# Patient Record
Sex: MALE | Race: WHITE | NOT HISPANIC OR LATINO | Employment: FULL TIME | URBAN - METROPOLITAN AREA
[De-identification: names, ages, dates, MRNs, and addresses within clinical notes are randomized per-mention and may not be internally consistent; named-entity substitution may affect disease eponyms.]

---

## 2017-05-15 ENCOUNTER — APPOINTMENT (OUTPATIENT)
Dept: RADIOLOGY | Facility: MEDICAL CENTER | Age: 52
End: 2017-05-15
Attending: FAMILY MEDICINE
Payer: COMMERCIAL

## 2017-05-15 ENCOUNTER — OFFICE VISIT (OUTPATIENT)
Dept: MEDICAL GROUP | Age: 52
End: 2017-05-15
Payer: COMMERCIAL

## 2017-05-15 VITALS
DIASTOLIC BLOOD PRESSURE: 70 MMHG | OXYGEN SATURATION: 99 % | SYSTOLIC BLOOD PRESSURE: 118 MMHG | HEART RATE: 72 BPM | TEMPERATURE: 98.1 F | WEIGHT: 223.6 LBS | BODY MASS INDEX: 33.12 KG/M2 | HEIGHT: 69 IN

## 2017-05-15 DIAGNOSIS — J40 BRONCHITIS: ICD-10-CM

## 2017-05-15 DIAGNOSIS — R05.9 COUGH: ICD-10-CM

## 2017-05-15 PROCEDURE — 99214 OFFICE O/P EST MOD 30 MIN: CPT | Performed by: FAMILY MEDICINE

## 2017-05-15 RX ORDER — ALBUTEROL SULFATE 90 UG/1
2 AEROSOL, METERED RESPIRATORY (INHALATION) EVERY 6 HOURS PRN
Qty: 8.5 G | Refills: 0 | Status: SHIPPED | OUTPATIENT
Start: 2017-05-15 | End: 2017-09-14

## 2017-05-15 RX ORDER — ACETAMINOPHEN, DEXTROMETHORPHAN HYDROBROMIDE, DIPHENHYDRAMINE HYDROCHLORIDE, GUAIFENESIN, AND PHENYLEPHRINE HYDROCHLORIDE 5-325MG/10
1 KIT ORAL 2 TIMES DAILY
Qty: 1 BOTTLE | Refills: 0 | Status: SHIPPED | OUTPATIENT
Start: 2017-05-15 | End: 2017-08-22

## 2017-05-15 RX ORDER — LATANOPROST 50 UG/ML
SOLUTION/ DROPS OPHTHALMIC
Refills: 4 | COMMUNITY
Start: 2017-04-14

## 2017-05-15 RX ORDER — AZITHROMYCIN 250 MG/1
TABLET, FILM COATED ORAL
Qty: 6 TAB | Refills: 0 | Status: SHIPPED | OUTPATIENT
Start: 2017-05-15 | End: 2017-05-20

## 2017-05-16 NOTE — PROGRESS NOTES
This medical record contains text that has been entered with the assistance of computer voice recognition and dictation software.  Therefore, it may contain unintended errors in text, spelling, punctuation, or grammar    No chief complaint on file.      Danny Guerrero is a 51 y.o. male here evaluation and management of: cough x 2wks with generalized mallaise      HPI:       Bronchitis  Patient is a 51-year-old male who presents to clinic with a chief complaint of cough which is nonproductive for about 2 weeks now. He states sometimes he coughs to the point where he gags, he cannot sleep at night as result. He did have associated generalized malaise which seems to be improving, no fevers no chills no headaches no neck stiffness no double vision, no nausea no vomiting. He is able tolerate by mouth fluids. He has children at home however he became sick before them.      Current medicines (including changes today)  Current Outpatient Prescriptions   Medication Sig Dispense Refill   • latanoprost (XALATAN) 0.005 % Solution PLACE 1 DROP INTO BOTH EYES AT BEDTIME  4   • azithromycin (ZITHROMAX) 250 MG Tab 2 tabs by mouth day 1, 1 tab by mouth days 2-5 6 Tab 0   • acetaminophen-codeine 120-12 MG/5ML suspension Take 10 mL by mouth every bedtime. 120 mL 0   • albuterol 108 (90 BASE) MCG/ACT Aero Soln inhalation aerosol Inhale 2 Puffs by mouth every 6 hours as needed for Shortness of Breath. 8.5 g 0   • PE-Diphenhydramine-DM-GG-APAP (MUCINEX CHILD MS DAY-NIGHT CLD) Misc Take 1 Application by mouth 2 Times a Day. 1 Bottle 0   • lisinopril (PRINIVIL) 20 MG Tab Take 1 Tab by mouth every day. 90 Tab 3   • atenolol (TENORMIN) 50 MG Tab Take 1 Tab by mouth every day. 90 Tab 3   • lovastatin (MEVACOR) 40 MG tablet Take 1 Tab by mouth every day. N THE EVENING 90 Tab 3   • omeprazole (PRILOSEC) 20 MG delayed-release capsule Take 1 Cap by mouth every day. 90 Cap 3   • alprazolam (XANAX) 0.5 MG Tab Take 1 Tab by mouth at bedtime as needed  "for Sleep or Anxiety. 30 Tab 0   • indomethacin SR (INDOCIN SR) 75 MG Cap CR Take 1 Cap by mouth 2 times a day. 30 Cap 0   • timolol (TIMOPTIC OCUDOSE) 0.5 % SOLN Place 1 Drop in both eyes 2 times a day.     • docosahexanoic acid (FISH OIL) 1000 MG CAPS Take 2,000 mg by mouth every day.       No current facility-administered medications for this visit.     He  has a past medical history of HTN; Hyperlipidemia; GERD (gastroesophageal reflux disease); Glaucoma; Kidney stones; Allergic rhinitis; Vitamin D deficiency; Impaired fasting glucose; and Anxiety.  He  has no past surgical history on file.  Social History   Substance Use Topics   • Smoking status: Never Smoker    • Smokeless tobacco: Never Used   • Alcohol Use: 0.0 oz/week     0 Standard drinks or equivalent per week      Comment: rare     Social History     Social History Narrative     Family History   Problem Relation Age of Onset   • Hypertension Father      Family Status   Relation Status Death Age   • Mother Alive    • Father Alive    • Sister Alive    • Brother Alive    • Maternal Grandmother     • Maternal Grandfather     • Paternal Grandmother     • Paternal Grandfather     • Sister Alive    • Brother Alive    • Brother Alive          ROS  Please see hpi    All other systems reviewed and are negative     Objective:     Blood pressure 118/70, pulse 72, temperature 36.7 °C (98.1 °F), height 1.753 m (5' 9.02\"), weight 101.424 kg (223 lb 9.6 oz), SpO2 99 %. Body mass index is 33 kg/(m^2).  Physical Exam:    Constitutional: Alert, no distress.  Skin: Warm, dry, good turgor, no rashes in visible areas.  Eye: Equal, round and reactive, conjunctiva clear, lids normal.  ENMT: Lips without lesions, good dentition, oropharynx clear.  Neck: Trachea midline, no masses, no thyromegaly. No cervical or supraclavicular lymphadenopathy.  Respiratory: Unlabored respiratory effort, lungs clear to auscultation, no wheezes, no " abiel.  Cardiovascular: Normal S1, S2, no murmur, no edema.  Abdomen: Soft, non-tender, no masses, no hepatosplenomegaly.  Psych: Alert and oriented x3, normal affect and mood.          Assessment and Plan:   The following treatment plan was discussed, again this medical record contains text that has been entered with the assistance of computer voice recognition and dictation software.  Therefore, it may contain unintended errors in text, spelling, punctuation, or grammar    1. Bronchitis  Since symptoms persist for 2wks and appear severe  Will begin antibiotics and chest plain films    - DX-CHEST-2 VIEWS; Future  - azithromycin (ZITHROMAX) 250 MG Tab; 2 tabs by mouth day 1, 1 tab by mouth days 2-5  Dispense: 6 Tab; Refill: 0  - acetaminophen-codeine 120-12 MG/5ML suspension; Take 10 mL by mouth every bedtime.  Dispense: 120 mL; Refill: 0  - PE-Diphenhydramine-DM-GG-APAP (MUCINEX CHILD MS DAY-NIGHT CLD) Misc; Take 1 Application by mouth 2 Times a Day.  Dispense: 1 Bottle; Refill: 0      HEALTH MAINTENANCE: due for colonoscopy    Followup: Return in about 3 months (around 8/15/2017) for Reevaluation.

## 2017-08-03 ENCOUNTER — TELEPHONE (OUTPATIENT)
Dept: MEDICAL GROUP | Facility: MEDICAL CENTER | Age: 52
End: 2017-08-03

## 2017-08-03 DIAGNOSIS — E78.5 HYPERLIPIDEMIA LDL GOAL <100: ICD-10-CM

## 2017-08-03 DIAGNOSIS — Z12.5 SCREENING FOR PROSTATE CANCER: ICD-10-CM

## 2017-08-03 DIAGNOSIS — R73.01 IMPAIRED FASTING GLUCOSE: ICD-10-CM

## 2017-08-03 DIAGNOSIS — I10 ESSENTIAL HYPERTENSION: ICD-10-CM

## 2017-08-03 DIAGNOSIS — E55.9 VITAMIN D DEFICIENCY: ICD-10-CM

## 2017-08-04 ENCOUNTER — OFFICE VISIT (OUTPATIENT)
Dept: MEDICAL GROUP | Facility: MEDICAL CENTER | Age: 52
End: 2017-08-04
Payer: COMMERCIAL

## 2017-08-04 VITALS
HEIGHT: 69 IN | OXYGEN SATURATION: 95 % | SYSTOLIC BLOOD PRESSURE: 112 MMHG | HEART RATE: 73 BPM | BODY MASS INDEX: 32.58 KG/M2 | DIASTOLIC BLOOD PRESSURE: 64 MMHG | WEIGHT: 220 LBS | TEMPERATURE: 98.3 F

## 2017-08-04 DIAGNOSIS — Z12.5 PROSTATE CANCER SCREENING: ICD-10-CM

## 2017-08-04 DIAGNOSIS — R10.9 ABDOMINAL PAIN, UNSPECIFIED LOCATION: ICD-10-CM

## 2017-08-04 DIAGNOSIS — R35.0 URINARY FREQUENCY: ICD-10-CM

## 2017-08-04 DIAGNOSIS — Z00.00 PREVENTATIVE HEALTH CARE: ICD-10-CM

## 2017-08-04 PROCEDURE — 99214 OFFICE O/P EST MOD 30 MIN: CPT | Performed by: INTERNAL MEDICINE

## 2017-08-04 NOTE — PROGRESS NOTES
Subjective:      Danny Guerrero is a 51 y.o. male who presents with Groin Pain            Groin Pain    has midline lower abdominal sharp pain off and on periodically over the past few years will flare up for a few days, feels like a sharp cramp, no radiation, not worse with pressing over the area, can be worse with activity, not precipitated by anything in particular such as diet, position, activity, exercise. Can be better with bowel movement, no constipation, may be relieved with urination, will mostly feel like a pressure, no dysuria or hematuria, no increase in urine frequency, no incontinence, no blood in stools, has had kidney stones in the past, this is different type of discomfort, no flank pain, no fever or chills. No scrotal or penis pains. No rashes penis or groin area. No diarrhea or change in bowel habits, no nausea vomiting.  No fevers, chills, night sweats  Discomfort improved today, typically symptoms will flare up and improve over time without any change in diet or activity  No rashes  No nausea or emesis, no change in stools  No urologic surgeries such as vasectomy, hernia repairs  No hernia or bulge noticeable  No trauma to the area  No personal history of prostate cancer family history of prostate cancer  Nocturia 1-2 times, no incontinence of bowel or bladder  Hypertension, blood pressure stable lisinopril and atenolol  History of gout no recurrence  Dyslipidemia on Mevacor no muscle pain        Current Outpatient Prescriptions   Medication Sig Dispense Refill   • latanoprost (XALATAN) 0.005 % Solution PLACE 1 DROP INTO BOTH EYES AT BEDTIME  4   • acetaminophen-codeine 120-12 MG/5ML suspension Take 10 mL by mouth every bedtime. 120 mL 0   • albuterol 108 (90 BASE) MCG/ACT Aero Soln inhalation aerosol Inhale 2 Puffs by mouth every 6 hours as needed for Shortness of Breath. 8.5 g 0   • PE-Diphenhydramine-DM-GG-APAP (MUCINEX CHILD MS DAY-NIGHT CLD) Misc Take 1 Application by mouth 2 Times a Day. 1  Bottle 0   • lisinopril (PRINIVIL) 20 MG Tab Take 1 Tab by mouth every day. 90 Tab 3   • atenolol (TENORMIN) 50 MG Tab Take 1 Tab by mouth every day. 90 Tab 3   • lovastatin (MEVACOR) 40 MG tablet Take 1 Tab by mouth every day. N THE EVENING 90 Tab 3   • omeprazole (PRILOSEC) 20 MG delayed-release capsule Take 1 Cap by mouth every day. 90 Cap 3   • alprazolam (XANAX) 0.5 MG Tab Take 1 Tab by mouth at bedtime as needed for Sleep or Anxiety. 30 Tab 0   • indomethacin SR (INDOCIN SR) 75 MG Cap CR Take 1 Cap by mouth 2 times a day. 30 Cap 0   • timolol (TIMOPTIC OCUDOSE) 0.5 % SOLN Place 1 Drop in both eyes 2 times a day.     • docosahexanoic acid (FISH OIL) 1000 MG CAPS Take 2,000 mg by mouth every day.       No current facility-administered medications for this visit.     Patient Active Problem List    Diagnosis Date Noted   • Cough 05/15/2017   • Bronchitis 05/15/2017   • Prediabetes 02/24/2016   • Anxiety 02/24/2016   • Vitamin D deficiency    • HTN (hypertension) 02/05/2014   • Glaucoma    • Preventative health care 02/17/2011   • Allergic rhinitis due to allergen 07/01/2010   • Hyperlipidemia 07/07/2009   • GERD (gastroesophageal reflux disease) 07/07/2009   • Kidney stones        ROS       Objective:       Physical Exam   Constitutional: He appears well-developed and well-nourished. No distress.   HENT:   Head: Normocephalic and atraumatic.   Eyes: Conjunctivae are normal. Right eye exhibits no discharge. Left eye exhibits no discharge.   Neck: No JVD present. No thyromegaly present.   Cardiovascular: Normal rate, regular rhythm and normal heart sounds.    No murmur heard.  Pulmonary/Chest: Effort normal and breath sounds normal. No respiratory distress. He has no wheezes.   Abdominal: Soft. Bowel sounds are normal. He exhibits no distension. There is no tenderness. There is no rebound and no guarding.   Genitourinary: Rectum normal and prostate normal. Guaiac negative stool. No penile tenderness.    Musculoskeletal: He exhibits no edema.   Neurological: He is alert.   Skin: He is not diaphoretic. No erythema.   Psychiatric: He has a normal mood and affect.   Nursing note and vitals reviewed.    No CVA tenderness, no lumbar spine tenderness  No hepatosplenomegaly  Tender over suprapubic region, no masses palpated, no ecchymosis, no bruising  No inguinal hernia or masses or adenopathy  No penile rashes or lesions  No scrotal masses or lesions, no hernia  Prostate mildly enlarged no nodules, guaiac negative          Assessment/Plan:     Assessment  #!  Suprapubic discomfort occurs sporadically over the last few years, recent 3 day episode waning, symptoms improving, no evidence of UTI or nephrolithiasis, no evidence of colitis or diverticulitis with no change in bowel habits, question interstitial cystitis    #2 mild BPH    #3 hypertension stable on lisinopril and atenolol    #4 obesity BMI 32.4    #5 dyslipidemia stable on Mevacor    #6 history of gout no recurrence    #7 impaired fasting blood sugar 105      Plan  #1 labs including urinalysis    #2 CT abdomen and pelvis    #3 continue check blood pressure and record    #4 emergency room precautions for worsening symptoms    #5 follow-up PCP as scheduled    #6 continue check blood pressure and record

## 2017-08-04 NOTE — MR AVS SNAPSHOT
"Danny Guerrero   2017 3:40 PM   Office Visit   MRN: [id number]    Department:  [facility]   Dept Phone:  [phone]    Description:  Male : 1965   Provider:  Delmar Damon M.D.           Reason for Visit     Groin Pain           Allergies as of 2017     Allergen Noted Reactions    Nkda [No Known Drug Allergy] 2009         You were diagnosed with     Abdominal pain, unspecified location   [9663075]       Urinary frequency   [788.41.ICD-9-CM]       Prostate cancer screening   [451924]       Preventative health care   [486021]         Vital Signs     Blood Pressure Pulse Temperature Height Weight Body Mass Index    112/64 mmHg 73 36.8 °C (98.3 °F) 1.753 m (5' 9\") 99.791 kg (220 lb) 32.47 kg/m2    Oxygen Saturation Smoking Status                95% Never Smoker           Basic Information     Date Of Birth Sex Race Ethnicity Preferred Language    1965 Male White Non- English      Your appointments     Sep 14, 2017  4:30 PM   ANNUAL EXAM PREVENTATIVE with VAHID Quach   [facility])    [address]  [facility] [id number] [phone]              Problem List              ICD-10-CM Priority Class Noted - Resolved    Hyperlipidemia E78.5   2009 - Present    GERD (gastroesophageal reflux disease) K21.9   2009 - Present    Kidney stones N20.0   Unknown - Present    Allergic rhinitis due to allergen J30.9   2010 - Present    Preventative health care Z00.00   2011 - Present    Glaucoma H40.9   Unknown - Present    HTN (hypertension) I10   2014 - Present    Vitamin D deficiency E55.9   Unknown - Present    Prediabetes R73.03   2016 - Present    Anxiety F41.9   2016 - Present    Cough R05   5/15/2017 - Present    Bronchitis J40   5/15/2017 - Present      Health Maintenance        Date Due Completion Dates    COLONOSCOPY 10/20/2015 ---    IMM INFLUENZA (1) 2017 10/19/2016    IMM " DTaP/Tdap/Td Vaccine (2 - Td) 2/17/2021 2/17/2011            Current Immunizations     Influenza Vaccine Quad Inj (Preserved) 10/19/2016    Tdap Vaccine 2/17/2011  3:45 PM      Below and/or attached are the medications your provider expects you to take. Review all of your home medications and newly ordered medications with your provider and/or pharmacist. Follow medication instructions as directed by your provider and/or pharmacist. Please keep your medication list with you and share with your provider. Update the information when medications are discontinued, doses are changed, or new medications (including over-the-counter products) are added; and carry medication information at all times in the event of emergency situations     Allergies:  NKDA - (reactions not documented)               Medications  Valid as of: August 04, 2017 -  4:46 PM    Generic Name Brand Name Tablet Size Instructions for use    Acetaminophen-Codeine (Suspension) acetaminophen-codeine 120-12 MG/5ML Take 10 mL by mouth every bedtime.        Albuterol Sulfate (Aero Soln) albuterol 108 (90 BASE) MCG/ACT Inhale 2 Puffs by mouth every 6 hours as needed for Shortness of Breath.        ALPRAZolam (Tab) XANAX 0.5 MG Take 1 Tab by mouth at bedtime as needed for Sleep or Anxiety.        Atenolol (Tab) TENORMIN 50 MG Take 1 Tab by mouth every day.        Indomethacin (Cap CR) INDOCIN SR 75 MG Take 1 Cap by mouth 2 times a day.        Latanoprost (Solution) XALATAN 0.005 % PLACE 1 DROP INTO BOTH EYES AT BEDTIME        Lisinopril (Tab) PRINIVIL 20 MG Take 1 Tab by mouth every day.        Lovastatin (Tab) MEVACOR 40 MG Take 1 Tab by mouth every day. N THE EVENING        Omega-3 Fatty Acids (Cap) OMEGA 3 FA 1000 MG Take 2,000 mg by mouth every day.        Omeprazole (CAPSULE DELAYED RELEASE) PRILOSEC 20 MG Take 1 Cap by mouth every day.        PE-Diphenhydramine-DM-GG-APAP (Misc) MUCINEX CHILD MS DAY-NIGHT CLD  Take 1 Application by mouth 2 Times a Day.         Timolol Maleate (Solution) TIMOPTIC 0.5 % Place 1 Drop in both eyes 2 times a day.        .                 Medicines prescribed today were sent to:     [facility][id number] - [place], [place] - [address]    [address] [place] [address]    Phone: [phone] Fax: [phone]    Open 24 Hours?: No      Medication refill instructions:       If your prescription bottle indicates you have medication refills left, it is not necessary to call your provider’s office. Please contact your pharmacy and they will refill your medication.    If your prescription bottle indicates you do not have any refills left, you may request refills at any time through one of the following ways: The online [employer] system (except Urgent Care), by calling your provider’s office, or by asking your pharmacy to contact your provider’s office with a refill request. Medication refills are processed only during regular business hours and may not be available until the next business day. Your provider may request additional information or to have a follow-up visit with you prior to refilling your medication.   *Please Note: Medication refills are assigned a new Rx number when refilled electronically. Your pharmacy may indicate that no refills were authorized even though a new prescription for the same medication is available at the pharmacy. Please request the medicine by name with the pharmacy before contacting your provider for a refill.        Your To Do List     Future Labs/Procedures Complete By Expires    CBC WITH DIFFERENTIAL  As directed 8/5/2018    COMP METABOLIC PANEL  As directed 8/5/2018    CT-ABDOMEN-PELVIS WITH  As directed 2/4/2018    HEMOGLOBIN A1C  As directed 8/5/2018    LIPID PROFILE  As directed 8/5/2018    MICROALBUMIN CREAT RATIO URINE  As directed 8/5/2018    PROSTATE SPECIFIC AG SCREENING  As directed 8/5/2018    TSH  As directed 8/5/2018    URINALYSIS,CULTURE IF INDICATED  As directed 8/5/2018         [employer]  Status: Patient Declined

## 2017-08-11 ENCOUNTER — OFFICE VISIT (OUTPATIENT)
Dept: MEDICAL GROUP | Facility: MEDICAL CENTER | Age: 52
End: 2017-08-11
Payer: COMMERCIAL

## 2017-08-11 ENCOUNTER — HOSPITAL ENCOUNTER (OUTPATIENT)
Dept: RADIOLOGY | Facility: MEDICAL CENTER | Age: 52
End: 2017-08-11
Attending: FAMILY MEDICINE
Payer: COMMERCIAL

## 2017-08-11 ENCOUNTER — TELEPHONE (OUTPATIENT)
Dept: MEDICAL GROUP | Facility: MEDICAL CENTER | Age: 52
End: 2017-08-11

## 2017-08-11 VITALS
WEIGHT: 220 LBS | SYSTOLIC BLOOD PRESSURE: 128 MMHG | BODY MASS INDEX: 32.58 KG/M2 | HEART RATE: 81 BPM | HEIGHT: 69 IN | TEMPERATURE: 97.7 F | DIASTOLIC BLOOD PRESSURE: 80 MMHG | OXYGEN SATURATION: 96 %

## 2017-08-11 DIAGNOSIS — M25.552 LEFT HIP PAIN: ICD-10-CM

## 2017-08-11 DIAGNOSIS — M16.12 PRIMARY OSTEOARTHRITIS OF LEFT HIP: ICD-10-CM

## 2017-08-11 PROCEDURE — 73502 X-RAY EXAM HIP UNI 2-3 VIEWS: CPT | Mod: LT

## 2017-08-11 PROCEDURE — 99214 OFFICE O/P EST MOD 30 MIN: CPT | Performed by: FAMILY MEDICINE

## 2017-08-11 RX ORDER — DICLOFENAC SODIUM 75 MG/1
75 TABLET, DELAYED RELEASE ORAL 2 TIMES DAILY
Qty: 30 TAB | Refills: 0 | Status: SHIPPED | OUTPATIENT
Start: 2017-08-11 | End: 2017-09-14

## 2017-08-11 NOTE — PATIENT INSTRUCTIONS
Hip Bursitis  Bursitis is a puffiness (swelling) and soreness of a fluid-filled sac (bursa). This sac covers and protects the joint.  HOME CARE  · Put ice on the injured area.  ¨ Put ice in a plastic bag.  ¨ Place a towel between your skin and the bag.  ¨ Leave the ice on for 15-20 minutes, 03-04 times a day.  · Rest the painful joint as much as possible. Move your joint at least 4 times a day. When pain lessens, start normal, slow movements and normal activities.  · Only take medicine as told by your doctor.  · Use crutches as told.  · Raise (elevate) your painful joint. Use pillows for propping your legs and hips.  · Get a massage to lessen pain.  GET HELP RIGHT AWAY IF:  · Your pain increases or does not improve during treatment.  · You have a fever.  · You feel heat coming from the affected area.  · You see redness and puffiness around the affected area.  · You have any questions or concerns.  MAKE SURE YOU:  · Understand these instructions.  · Will watch your condition.  · Will get help right away if you are not well or get worse.     This information is not intended to replace advice given to you by your health care provider. Make sure you discuss any questions you have with your health care provider.     Document Released: 01/20/2012 Document Revised: 03/11/2013 Document Reviewed: 01/20/2012  [employer] Interactive Patient Education ©2016 [employer]

## 2017-08-11 NOTE — MR AVS SNAPSHOT
"        Danny Guerrero   2017 2:40 PM   Office Visit   MRN: [id number]    Department:  [facility]   Dept Phone:  [phone]    Description:  Male : 1965   Provider:  Yessi Garcia M.D.           Reason for Visit     Leg Pain left leg      Allergies as of 2017     Allergen Noted Reactions    Nkda [No Known Drug Allergy] 2009         You were diagnosed with     Left hip pain   [394942]         Vital Signs     Blood Pressure Pulse Temperature Height Weight Body Mass Index    128/80 mmHg 81 36.5 °C (97.7 °F) 1.753 m (5' 9\") 99.791 kg (220 lb) 32.47 kg/m2    Oxygen Saturation Smoking Status                96% Never Smoker           Basic Information     Date Of Birth Sex Race Ethnicity Preferred Language    1965 Male White Non- English      Your appointments     Aug 11, 2017  3:30 PM   XRAY 15 with [facility] DX ICU   [facility] - DIAGNOSTIC - [facility]  [address]  [facility] [address]   [phone]            Sep 14, 2017  4:30 PM   ANNUAL EXAM PREVENTATIVE with VAHID Quach   [facility])    [address]  [place] [address] [phone]            Sep 16, 2017 11:00 AM   CT BODY WITH with [facility] CT 1   [facility] - [facility])    [address]  [facility] [address] [phone]           Some exams require specific prep instructions that would have been given to you at time of scheduling. If you have any additional questions about the prep instructions, please call Imaging Scheduling at [phone] and press #2.              Problem List              ICD-10-CM Priority Class Noted - Resolved    Hyperlipidemia E78.5   2009 - Present    GERD (gastroesophageal reflux disease) K21.9   2009 - Present    Kidney stones N20.0   Unknown - Present    Allergic rhinitis due to allergen J30.9   2010 - Present   " Preventative health care Z00.00   2/17/2011 - Present    Glaucoma H40.9   Unknown - Present    HTN (hypertension) I10   2/5/2014 - Present    Vitamin D deficiency E55.9   Unknown - Present    Prediabetes R73.03   2/24/2016 - Present    Anxiety F41.9   2/24/2016 - Present    Cough R05   5/15/2017 - Present    Bronchitis J40   5/15/2017 - Present    Left hip pain M25.552   8/11/2017 - Present      Health Maintenance        Date Due Completion Dates    COLONOSCOPY 10/20/2015 ---    IMM INFLUENZA (1) 9/1/2017 10/19/2016    IMM DTaP/Tdap/Td Vaccine (2 - Td) 2/17/2021 2/17/2011            Current Immunizations     Influenza Vaccine Quad Inj (Preserved) 10/19/2016    Tdap Vaccine 2/17/2011  3:45 PM      Below and/or attached are the medications your provider expects you to take. Review all of your home medications and newly ordered medications with your provider and/or pharmacist. Follow medication instructions as directed by your provider and/or pharmacist. Please keep your medication list with you and share with your provider. Update the information when medications are discontinued, doses are changed, or new medications (including over-the-counter products) are added; and carry medication information at all times in the event of emergency situations     Allergies:  NKDA - (reactions not documented)               Medications  Valid as of: August 11, 2017 -  3:24 PM    Generic Name Brand Name Tablet Size Instructions for use    Acetaminophen-Codeine (Suspension) acetaminophen-codeine 120-12 MG/5ML Take 10 mL by mouth every bedtime.        Albuterol Sulfate (Aero Soln) albuterol 108 (90 BASE) MCG/ACT Inhale 2 Puffs by mouth every 6 hours as needed for Shortness of Breath.        ALPRAZolam (Tab) XANAX 0.5 MG Take 1 Tab by mouth at bedtime as needed for Sleep or Anxiety.        Atenolol (Tab) TENORMIN 50 MG Take 1 Tab by mouth every day.        Diclofenac Sodium (Tablet Delayed Response) VOLTAREN 75 MG Take 1 Tab by mouth 2  times a day.        Latanoprost (Solution) XALATAN 0.005 % PLACE 1 DROP INTO BOTH EYES AT BEDTIME        Lisinopril (Tab) PRINIVIL 20 MG Take 1 Tab by mouth every day.        Lovastatin (Tab) MEVACOR 40 MG Take 1 Tab by mouth every day. N THE EVENING        Omega-3 Fatty Acids (Cap) OMEGA 3 FA 1000 MG Take 2,000 mg by mouth every day.        Omeprazole (CAPSULE DELAYED RELEASE) PRILOSEC 20 MG Take 1 Cap by mouth every day.        PE-Diphenhydramine-DM-GG-APAP (Misc) MUCINEX CHILD MS DAY-NIGHT CLD  Take 1 Application by mouth 2 Times a Day.        Timolol Maleate (Solution) TIMOPTIC 0.5 % Place 1 Drop in both eyes 2 times a day.        .                 Medicines prescribed today were sent to:     [facility][id number] - [place], [place] - [address]    [address] [facility] [place] [address]    Phone: [phone] Fax: [phone]    Open 24 Hours?: No      Medication refill instructions:       If your prescription bottle indicates you have medication refills left, it is not necessary to call your provider’s office. Please contact your pharmacy and they will refill your medication.    If your prescription bottle indicates you do not have any refills left, you may request refills at any time through one of the following ways: The online [facility] system (except Urgent Care), by calling your provider’s office, or by asking your pharmacy to contact your provider’s office with a refill request. Medication refills are processed only during regular business hours and may not be available until the next business day. Your provider may request additional information or to have a follow-up visit with you prior to refilling your medication.   *Please Note: Medication refills are assigned a new Rx number when refilled electronically. Your pharmacy may indicate that no refills were authorized even though a new prescription for the same medication is available at the pharmacy. Please request the medicine by name with the pharmacy before  contacting your provider for a refill.        Your To Do List     Future Labs/Procedures Complete By Expires    DX-HIP-COMPLETE - UNILATERAL 2+ LEFT  As directed 8/11/2018      Referral     A referral request has been sent to our patient care coordination department. Please allow 3-5 business days for us to process this request and contact you either by phone or mail. If you do not hear from us by the 5th business day, please call us at [phone].        Instructions    Hip Bursitis  Bursitis is a puffiness (swelling) and soreness of a fluid-filled sac (bursa). This sac covers and protects the joint.  HOME CARE  · Put ice on the injured area.  ¨ Put ice in a plastic bag.  ¨ Place a towel between your skin and the bag.  ¨ Leave the ice on for 15-20 minutes, 03-04 times a day.  · Rest the painful joint as much as possible. Move your joint at least 4 times a day. When pain lessens, start normal, slow movements and normal activities.  · Only take medicine as told by your doctor.  · Use crutches as told.  · Raise (elevate) your painful joint. Use pillows for propping your legs and hips.  · Get a massage to lessen pain.  GET HELP RIGHT AWAY IF:  · Your pain increases or does not improve during treatment.  · You have a fever.  · You feel heat coming from the affected area.  · You see redness and puffiness around the affected area.  · You have any questions or concerns.  MAKE SURE YOU:  · Understand these instructions.  · Will watch your condition.  · Will get help right away if you are not well or get worse.     This information is not intended to replace advice given to you by your health care provider. Make sure you discuss any questions you have with your health care provider.     Document Released: 01/20/2012 Document Revised: 03/11/2013 Document Reviewed: 01/20/2012  [employer] Interactive Patient Education ©2016 [employer]            MyChart Status: Patient Declined

## 2017-08-11 NOTE — TELEPHONE ENCOUNTER
----- Message from Yessi Garcia M.D. sent at 8/11/2017  4:17 PM PDT -----  Please notify patient of their xray results. I would recommend that he see an orthopedist. I'll place the referral.  Yessi Garcia M.D.

## 2017-08-11 NOTE — ASSESSMENT & PLAN NOTE
Complains of pain in the left hip starting 2 nights ago.  He denies any injury.  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  Worse when he sits for a while or he puts weight on it.  He is able to walk on it with a limp. He had to leave work early today because of the pain. He has not taken any ibuprofen. He denies any numbness or tingling. He reports the pain is in both the posterior and anterior aspect of the left hip.

## 2017-08-12 ENCOUNTER — APPOINTMENT (OUTPATIENT)
Dept: RADIOLOGY | Facility: MEDICAL CENTER | Age: 52
End: 2017-08-12
Attending: INTERNAL MEDICINE
Payer: COMMERCIAL

## 2017-08-17 NOTE — PROGRESS NOTES
Subjective:     Chief Complaint   Patient presents with   • Leg Pain     left leg       Danny Guerrero is a 51 y.o. male here today for evaluation and management of:    Left hip pain  Complains of pain in the left hip starting 2 nights ago.  He denies any injury.  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  Worse when he sits for a while or he puts weight on it.  He is able to walk on it with a limp. He had to leave work early today because of the pain. He has not taken any ibuprofen. He denies any numbness or tingling. He reports the pain is in both the posterior and anterior aspect of the left hip.       Allergies   Allergen Reactions   • Nkda [No Known Drug Allergy]        Current medicines (including changes today)  Current Outpatient Prescriptions   Medication Sig Dispense Refill   • diclofenac EC (VOLTAREN) 75 MG Tablet Delayed Response Take 1 Tab by mouth 2 times a day. 30 Tab 0   • latanoprost (XALATAN) 0.005 % Solution PLACE 1 DROP INTO BOTH EYES AT BEDTIME  4   • acetaminophen-codeine 120-12 MG/5ML suspension Take 10 mL by mouth every bedtime. 120 mL 0   • albuterol 108 (90 BASE) MCG/ACT Aero Soln inhalation aerosol Inhale 2 Puffs by mouth every 6 hours as needed for Shortness of Breath. 8.5 g 0   • PE-Diphenhydramine-DM-GG-APAP (MUCINEX CHILD MS DAY-NIGHT CLD) Misc Take 1 Application by mouth 2 Times a Day. 1 Bottle 0   • lisinopril (PRINIVIL) 20 MG Tab Take 1 Tab by mouth every day. 90 Tab 3   • atenolol (TENORMIN) 50 MG Tab Take 1 Tab by mouth every day. 90 Tab 3   • lovastatin (MEVACOR) 40 MG tablet Take 1 Tab by mouth every day. N THE EVENING 90 Tab 3   • omeprazole (PRILOSEC) 20 MG delayed-release capsule Take 1 Cap by mouth every day. 90 Cap 3   • alprazolam (XANAX) 0.5 MG Tab Take 1 Tab by mouth at bedtime as needed for Sleep or Anxiety. 30 Tab 0   • timolol (TIMOPTIC OCUDOSE) 0.5 % SOLN Place 1 Drop in both eyes 2 times a day.     • docosahexanoic acid (FISH OIL) 1000 MG CAPS Take  "2,000 mg by mouth every day.       No current facility-administered medications for this visit.       He  has a past medical history of HTN; Hyperlipidemia; GERD (gastroesophageal reflux disease); Glaucoma; Kidney stones; Allergic rhinitis; Vitamin D deficiency; Impaired fasting glucose; and Anxiety.    Patient Active Problem List    Diagnosis Date Noted   • Left hip pain 08/11/2017   • Primary osteoarthritis of left hip 08/11/2017   • Cough 05/15/2017   • Bronchitis 05/15/2017   • Prediabetes 02/24/2016   • Anxiety 02/24/2016   • Vitamin D deficiency    • HTN (hypertension) 02/05/2014   • Glaucoma    • Preventative health care 02/17/2011   • Allergic rhinitis due to allergen 07/01/2010   • Hyperlipidemia 07/07/2009   • GERD (gastroesophageal reflux disease) 07/07/2009   • Kidney stones        ROS   No fever or chills.  No nausea or vomiting.  No chest pain or palpitations.  No cough or SOB.  No pain with urination or hematuria.  No black or bloody stools.       Objective:     Blood pressure 128/80, pulse 81, temperature 36.5 °C (97.7 °F), height 1.753 m (5' 9\"), weight 99.791 kg (220 lb), SpO2 96 %. Body mass index is 32.47 kg/(m^2).   Physical Exam:  Well developed, well nourished.  Alert, oriented in mild acute distress.  Eye contact is good, speech goal directed, affect calm  Eyes: conjunctiva non-injected, sclera non-icteric.  Lungs: clear to auscultation bilaterally with good excursion. No wheezes or rhonchi  CV: regular rate and rhythm. No murmur  Abdomen: soft, nontender, no masses or organomegaly.  No rebound or guarding  SPINE: No significant spinal curvature on forward bend. Mild tenderness in paraspinous muscles lumbar spine without current spasm. SLT negative. DTR 2+ patella, 1+ achilles bilaterally. Strength 5/5 proximal and distal LE.  No pain with stress of SI. Full hip ROM. Poor hamstring flexibility. No symptoms with axial loading.   Hip: Tenderness to palpation on anterior and posterior aspect of " the left hip. Decreased flexion, extension, abduction and adduction ROM secondary to pain. Significant pain with axial load or internal rotation. Piriformis provacative tests: Pain elicited with resisted hip abduction and external rotation, forced hip adduction, flexion, and internal rotation. Negative SLR.  Exam was limited by patient's noncooperation and pain.              Assessment and Plan:   The following treatment plan was discussed    1. Left hip pain  X-ray to assess. Diclofenac twice a day. Refer to physical therapy and orthopedics.  - DX-HIP-COMPLETE - UNILATERAL 2+ LEFT; Future  - diclofenac EC (VOLTAREN) 75 MG Tablet Delayed Response; Take 1 Tab by mouth 2 times a day.  Dispense: 30 Tab; Refill: 0  - REFERRAL TO PHYSICAL THERAPY Reason for Therapy: Eval/Treat/Report  - REFERRAL TO ORTHOPEDICS    2. Primary osteoarthritis of left hip  See #1  - REFERRAL TO ORTHOPEDICS    Any change or worsening of signs or symptoms, patient encouraged to follow-up or report to the emergency room for further evaluation. Patient understands and agrees.    Followup: Return if symptoms worsen or fail to improve.

## 2017-08-21 DIAGNOSIS — F41.0 ANXIETY ATTACK: ICD-10-CM

## 2017-08-22 RX ORDER — ALPRAZOLAM 0.5 MG/1
0.5 TABLET ORAL NIGHTLY PRN
Qty: 30 TAB | Refills: 0 | Status: SHIPPED
Start: 2017-08-22 | End: 2018-03-20 | Stop reason: SDUPTHER

## 2017-08-22 RX ORDER — ALPRAZOLAM 0.5 MG/1
TABLET ORAL
OUTPATIENT
Start: 2017-08-22

## 2017-08-22 NOTE — TELEPHONE ENCOUNTER
According to his med list he is also on a narcotic.  I cannot order xanax when he is on a narcotic.

## 2017-08-29 ENCOUNTER — TELEPHONE (OUTPATIENT)
Dept: MEDICAL GROUP | Facility: MEDICAL CENTER | Age: 52
End: 2017-08-29

## 2017-08-29 NOTE — TELEPHONE ENCOUNTER
----- Message from Delmar Damon M.D. sent at 8/29/2017 10:20 AM PDT -----  Please notify linda's patient that his blood tests show:  (1) normal liver function and kidney function on the blood test and urine test  (2) cholesterol is improved at 148, his good cholesterol was slightly low at 36 (goal is greater than 40), his bad cholesterol is excellent at 85 (goal is less than 100) no medications are needed for his cholesterol  (3) his A1c 3 months blood sugar test is slightly elevated at 6.2%, indicating prediabetes, but no change from the last 2 years  (4) his prostate cancer blood test is normal and his thyroid function test is normal  (5) he can discuss these results with linda at his next appointment

## 2017-09-14 ENCOUNTER — OFFICE VISIT (OUTPATIENT)
Dept: MEDICAL GROUP | Facility: MEDICAL CENTER | Age: 52
End: 2017-09-14
Payer: COMMERCIAL

## 2017-09-14 VITALS
TEMPERATURE: 97.7 F | WEIGHT: 225 LBS | SYSTOLIC BLOOD PRESSURE: 116 MMHG | HEART RATE: 59 BPM | DIASTOLIC BLOOD PRESSURE: 80 MMHG | OXYGEN SATURATION: 94 % | HEIGHT: 69 IN | BODY MASS INDEX: 33.33 KG/M2

## 2017-09-14 DIAGNOSIS — R10.30 LOWER ABDOMINAL PAIN: ICD-10-CM

## 2017-09-14 DIAGNOSIS — Z23 NEED FOR INFLUENZA VACCINATION: ICD-10-CM

## 2017-09-14 DIAGNOSIS — R73.01 IFG (IMPAIRED FASTING GLUCOSE): ICD-10-CM

## 2017-09-14 DIAGNOSIS — Z00.00 ANNUAL PHYSICAL EXAM: ICD-10-CM

## 2017-09-14 DIAGNOSIS — E66.9 OBESITY (BMI 30-39.9): ICD-10-CM

## 2017-09-14 DIAGNOSIS — E78.5 HYPERLIPIDEMIA LDL GOAL <100: ICD-10-CM

## 2017-09-14 DIAGNOSIS — M25.552 LEFT HIP PAIN: ICD-10-CM

## 2017-09-14 DIAGNOSIS — M1A.00X0 IDIOPATHIC CHRONIC GOUT WITHOUT TOPHUS, UNSPECIFIED SITE: ICD-10-CM

## 2017-09-14 DIAGNOSIS — I10 ESSENTIAL HYPERTENSION: ICD-10-CM

## 2017-09-14 DIAGNOSIS — K21.9 GASTROESOPHAGEAL REFLUX DISEASE WITHOUT ESOPHAGITIS: ICD-10-CM

## 2017-09-14 PROCEDURE — 90686 IIV4 VACC NO PRSV 0.5 ML IM: CPT | Performed by: NURSE PRACTITIONER

## 2017-09-14 PROCEDURE — 90471 IMMUNIZATION ADMIN: CPT | Performed by: NURSE PRACTITIONER

## 2017-09-14 PROCEDURE — 99396 PREV VISIT EST AGE 40-64: CPT | Mod: 25 | Performed by: NURSE PRACTITIONER

## 2017-09-14 RX ORDER — LOVASTATIN 40 MG/1
40 TABLET ORAL
Qty: 90 TAB | Refills: 3 | Status: SHIPPED | OUTPATIENT
Start: 2017-09-14 | End: 2018-09-19 | Stop reason: SDUPTHER

## 2017-09-14 RX ORDER — OMEPRAZOLE 20 MG/1
20 CAPSULE, DELAYED RELEASE ORAL
Qty: 90 CAP | Refills: 3 | Status: SHIPPED | OUTPATIENT
Start: 2017-09-14 | End: 2018-09-19 | Stop reason: SDUPTHER

## 2017-09-14 RX ORDER — LISINOPRIL 20 MG/1
20 TABLET ORAL DAILY
Qty: 90 TAB | Refills: 3 | Status: SHIPPED | OUTPATIENT
Start: 2017-09-14 | End: 2018-09-19 | Stop reason: SDUPTHER

## 2017-09-14 RX ORDER — ATENOLOL 50 MG/1
50 TABLET ORAL DAILY
Qty: 90 TAB | Refills: 3 | Status: SHIPPED | OUTPATIENT
Start: 2017-09-14 | End: 2018-09-19 | Stop reason: SDUPTHER

## 2017-09-14 ASSESSMENT — PATIENT HEALTH QUESTIONNAIRE - PHQ9: CLINICAL INTERPRETATION OF PHQ2 SCORE: 0

## 2017-09-15 NOTE — PROGRESS NOTES
Subjective:      Danny Guerrero is a 51 y.o. male who presents with No chief complaint on file.            HPI  Seen in [facility] for PE.  About 1 month ago he started having more lower abd pain.  He saw someone here and they wanted to do a CT ABD. Then one week later he started having left hip pain.  He then did a hip xray.  It showed Bilateral hip osteoarthritis, right greater than left.  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    He hasn't had any further pain.  He has resched the ct abd for this weekend but really doesn't want to do the scan.  Very  $$$.    He has changed his diet.  Eating better and doing more exercise.  He is hiking more.    He has been doing exercises on his hip and the pain is getting better. He has seen [facility] for it also.   He had a episode of gout.  He used indomethocin and it raised his bp. He only has occas attacks.  We will use colchicine in the future.  He has only had in great toe and knee.  About 3x in last year.    Needs refills on meds.  Reviewed lab with pt.  His TSH, CBC, PSA, alb/cr ratio, GFR, UA is wnl.  a1c is stable at 6.2.  Same as last year. CMP is wnl except glucose mildly elevated at 102.  LP SHOWS good trg and LDL.  HDL is low but up from 31 last year to 36.     Patient Active Problem List    Diagnosis Date Noted   • Obesity (BMI 30-39.9) 09/14/2017   • Primary osteoarthritis of left hip 08/11/2017   • Prediabetes 02/24/2016   • Anxiety 02/24/2016   • Vitamin D deficiency    • HTN (hypertension) 02/05/2014   • Glaucoma    • Preventative health care 02/17/2011   • Allergic rhinitis due to allergen 07/01/2010   • Hyperlipidemia 07/07/2009   • GERD (gastroesophageal reflux disease) 07/07/2009   • Kidney stones      Current Outpatient Prescriptions   Medication Sig Dispense Refill   • atenolol (TENORMIN) 50 MG Tab Take 1 Tab by mouth every day. 90 Tab 3   • lisinopril (PRINIVIL) 20 MG Tab Take 1 Tab by mouth every  day. 90 Tab 3   • omeprazole (PRILOSEC) 20 MG delayed-release capsule Take 1 Cap by mouth every day. 90 Cap 3   • lovastatin (MEVACOR) 40 MG tablet Take 1 Tab by mouth every day. N THE EVENING 90 Tab 3   • alprazolam (XANAX) 0.5 MG Tab Take 1 Tab by mouth at bedtime as needed for Sleep or Anxiety. 30 Tab 0   • latanoprost (XALATAN) 0.005 % Solution PLACE 1 DROP INTO BOTH EYES AT BEDTIME  4   • docosahexanoic acid (FISH OIL) 1000 MG CAPS Take 2,000 mg by mouth every day.       No current facility-administered medications for this visit.      Allergies   Allergen Reactions   • Nkda [No Known Drug Allergy]        ROS  Review of Systems   Constitutional: Negative.  Negative for fever, chills, weight loss, malaise/fatigue and diaphoresis.   HENT: Negative.  Negative for hearing loss, ear pain, nosebleeds, congestion, sore throat, neck pain, tinnitus and ear discharge.    Eyes: Negative.  Negative for blurred vision, double vision, photophobia, pain, discharge and redness.   Respiratory: Negative.  Negative for cough, hemoptysis, sputum production,  wheezing and stridor.  occas shortness of breath  Cardiovascular: Negative.  Negative for chest pain, palpitations, orthopnea, claudication, leg swelling and PND.   Gastrointestinal: Negative.  Negative for heartburn, nausea, vomiting, abdominal pain, diarrhea, constipation, blood in stool and melena.   Genitourinary: Negative.  Negative for dysuria, urgency, frequency, incontinence, hematuria and flank pain.   Musculoskeletal: Negative.  Negative for myalgias, back pain, and falls.   Skin: Negative.  Negative for itching and rash.   Neurological: Negative.  Negative for dizziness, tingling, tremors, sensory change, speech change, focal weakness, seizures, loss of consciousness, weakness and headaches.   Endo/Heme/Allergies: Negative.  Negative for environmental allergies and polydipsia. Does not bruise/bleed easily.   Psychiatric/Behavioral: Negative.  Negative for  "depression, suicidal ideas, hallucinations, memory loss and substance abuse. The patient is not nervous/anxious and does not have insomnia.    All other systems reviewed and are negative.           Objective:     /80   Pulse (!) 59   Temp 36.5 °C (97.7 °F)   Ht 1.753 m (5' 9\")   Wt 102.1 kg (225 lb)   SpO2 94%   BMI 33.23 kg/m²      Physical Exam      Physical Exam   Vitals reviewed.  Constitutional: oriented to person, place, and time. appears well-developed and well-nourished. No distress.   HENT: Head: Normocephalic and atraumatic. Bilateral tympanic membranes wnl w/o bulging.  Right Ear: External ear normal. Left Ear: External ear normal. Nose: Nose normal.  Mouth/Throat: Oropharynx is clear and moist. No oropharyngeal exudate. enid tm wnl. Eyes: Conjunctivae and EOM are normal. Pupils are equal, round, and reactive to light. Right eye exhibits no discharge. Left eye exhibits no discharge. No scleral icterus.    Neck: Normal range of motion. Neck supple. No JVD present.   Cardiovascular: Normal rate, regular rhythm, normal heart sounds and intact distal pulses.  Exam reveals no gallop and no friction rub.  No murmur heard.  No carotid bruits   Pulmonary/Chest: Effort normal and breath sounds normal. No stridor. No respiratory distress. no wheezes or rales. exhibits no tenderness.   Abdominal: Soft. Bowel sounds are normal. exhibits no distension and no mass. No tenderness. no rebound and no guarding.   Musculoskeletal: Normal range of motion. exhibits no edema or tenderness.  enid pedal pulses 2+.  Lymphadenopathy:  no cervical or supraclavicular adenopathy.   Neurological: alert and oriented to person, place, and time. has normal reflexes. displays normal reflexes. No cranial nerve deficit. exhibits normal muscle tone. Coordination normal.   Skin: Skin is warm and dry. No rash noted. no diaphoresis. No erythema. No pallor.   Psychiatric: normal mood and affect. behavior is normal.          "   Assessment/Plan:     1. Annual physical exam      all is stable.  continue healthy diet and exercise  f/u yearly call for lab slip   2. Idiopathic chronic gout without tophus, unspecified site      no curerent sx. recheck uric acid next yr.  if sx reoccur use colchicine d/t indocin inc bp.     3. Left hip pain      continue home exercise.  f/u if pain reoccurs.   4. Lower abdominal pain      pain has resolved so will wait on ct abd at this time.  reconsider ct abd if pain reoccurs.     5. Essential hypertension  atenolol (TENORMIN) 50 MG Tab    lisinopril (PRINIVIL) 20 MG Tab    stable and cotnrolled on meds.  refilled all meds   6. Gastroesophageal reflux disease without esophagitis  omeprazole (PRILOSEC) 20 MG delayed-release capsule    refilled all meds   7. Hyperlipidemia LDL goal <100  lovastatin (MEVACOR) 40 MG tablet    improved on low complex carb/fat/chol diet and inc exercise.     8. Obesity (BMI 30-39.9)  Patient identified as having weight management issue.  Appropriate orders and counseling given.   9. IFG (impaired fasting glucose)      stable w/o meds     10.  F/u yearly call for lab slip  11.  F/u sooner if abd pain reoccurs.

## 2017-09-16 ENCOUNTER — APPOINTMENT (OUTPATIENT)
Dept: RADIOLOGY | Facility: MEDICAL CENTER | Age: 52
End: 2017-09-16
Attending: INTERNAL MEDICINE
Payer: COMMERCIAL

## 2017-09-16 DIAGNOSIS — I10 ESSENTIAL HYPERTENSION: ICD-10-CM

## 2017-09-16 DIAGNOSIS — E78.5 HYPERLIPIDEMIA LDL GOAL <100: ICD-10-CM

## 2017-09-17 RX ORDER — LOVASTATIN 40 MG/1
TABLET ORAL
Qty: 90 TAB | Refills: 3 | Status: SHIPPED | OUTPATIENT
Start: 2017-09-17 | End: 2019-06-14

## 2017-09-17 RX ORDER — ATENOLOL 50 MG/1
50 TABLET ORAL DAILY
Qty: 90 TAB | Refills: 3 | Status: SHIPPED | OUTPATIENT
Start: 2017-09-17 | End: 2019-06-14

## 2017-09-17 RX ORDER — LISINOPRIL 20 MG/1
20 TABLET ORAL DAILY
Qty: 90 TAB | Refills: 3 | Status: SHIPPED | OUTPATIENT
Start: 2017-09-17 | End: 2019-06-14

## 2018-01-29 ENCOUNTER — TELEPHONE (OUTPATIENT)
Dept: MEDICAL GROUP | Facility: MEDICAL CENTER | Age: 53
End: 2018-01-29

## 2018-01-29 DIAGNOSIS — M10.00 ACUTE IDIOPATHIC GOUT, UNSPECIFIED SITE: ICD-10-CM

## 2018-01-30 NOTE — TELEPHONE ENCOUNTER
1. Caller Name: Pt                      Call Back Number: [phone] (home)    2. Message: Pt left message stating he would like refill for gout medication. He previously took Indomethacin which caused an elevated HR, but discussed switching to colchicine at his last appt. He would like rx for the Colchicine.     3. Patient approves office to leave a detailed voicemail/MyChart message: N\A

## 2018-01-31 NOTE — TELEPHONE ENCOUNTER
Pt left message stating he would like refill for gout medication. He previously took Indomethacin which caused an elevated HR, but discussed switching to colchicine at his last appt. He would like rx for the Colchicine.

## 2018-02-01 RX ORDER — COLCHICINE 0.6 MG/1
0.6 TABLET ORAL DAILY
Qty: 3 TAB | Refills: 0 | Status: SHIPPED | OUTPATIENT
Start: 2018-02-01 | End: 2018-03-20 | Stop reason: SDUPTHER

## 2018-02-01 NOTE — TELEPHONE ENCOUNTER
The amt that i gave him should take care of the flare that he is having.  If not he needs to be seen to determine if it really is gout.

## 2018-02-01 NOTE — TELEPHONE ENCOUNTER
Yes pt is having episode. Pt is asking if he can have a script on hand. Pt is asking if this medication is just for when it flares up or if there is something he can take to prevent the flare up

## 2018-02-01 NOTE — TELEPHONE ENCOUNTER
The colchicine is usually just used for flare ups.  There is allopurinol to take daily to prevent flare ups.  i will order him the colchicine for now.  He needs to see me for the allopurinol

## 2018-03-20 DIAGNOSIS — M10.00 ACUTE IDIOPATHIC GOUT, UNSPECIFIED SITE: ICD-10-CM

## 2018-03-20 DIAGNOSIS — F41.0 ANXIETY ATTACK: ICD-10-CM

## 2018-03-20 RX ORDER — COLCHICINE 0.6 MG/1
0.6 TABLET ORAL DAILY
Qty: 3 TAB | Refills: 0 | Status: SHIPPED | OUTPATIENT
Start: 2018-03-20 | End: 2019-12-13

## 2018-03-20 RX ORDER — ALPRAZOLAM 0.5 MG/1
0.5 TABLET ORAL NIGHTLY PRN
Qty: 30 TAB | Refills: 0 | Status: SHIPPED
Start: 2018-03-20 | End: 2018-10-31 | Stop reason: SDUPTHER

## 2018-03-21 NOTE — TELEPHONE ENCOUNTER
Spoke to pt, who stated it is a great inconvenience for him to have to pay a co-pay for only 3 pills a time.   He has gone thru this problem before and it was fixed to where he received a 30 days supply. 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 He would like a call from you directly.

## 2018-03-21 NOTE — TELEPHONE ENCOUNTER
Pt called back to check status on his refill as he had not heard anything back. Pt also requested to know the amount of them dispense quantity as he had an issue with this previously. Pt notified there were three pills faxed to Pharmacy in which Pt began shouting and very upset at this. Pt is requesting to speak directly to Stella regarding this issue as he is tired of leaving messages and not receiving a call back. Pt is traveling in [place] and wants this taken care of.

## 2018-03-21 NOTE — TELEPHONE ENCOUNTER
i haven't ordered colchicine for him before.  Have him come into discuss this with me.  Have him do uric acid before appt.  If he is having freq episodes of gout then he needs allopurinol instead.

## 2018-03-21 NOTE — TELEPHONE ENCOUNTER
i refilled the xanax yesterday.  i should have been faxed to the pharmacy.  Last refill of xanax lasted him 7 months so he should not need more.  If having issues he will need to be seen.  i only gave colchicine 3 tabs since that is the approp dose for a gout flare.  If he is having a lot of flare ups, he needs to be seen for more approp med.

## 2018-10-15 ENCOUNTER — TELEPHONE (OUTPATIENT)
Dept: MEDICAL GROUP | Facility: MEDICAL CENTER | Age: 53
End: 2018-10-15

## 2018-10-15 LAB
ALBUMIN SERPL-MCNC: 4.4 G/DL (ref 3.5–5.5)
ALBUMIN/CREAT UR: 2.6 MG/G CREAT (ref 0–30)
ALBUMIN/GLOB SERPL: 1.5 {RATIO} (ref 1.2–2.2)
ALP SERPL-CCNC: 60 IU/L (ref 39–117)
ALT SERPL-CCNC: 32 IU/L (ref 0–44)
AST SERPL-CCNC: 16 IU/L (ref 0–40)
BILIRUB SERPL-MCNC: 0.4 MG/DL (ref 0–1.2)
BUN SERPL-MCNC: 14 MG/DL (ref 6–24)
BUN/CREAT SERPL: 14 (ref 9–20)
CALCIUM SERPL-MCNC: 9.6 MG/DL (ref 8.7–10.2)
CHLORIDE SERPL-SCNC: 103 MMOL/L (ref 96–106)
CHOLEST SERPL-MCNC: 137 MG/DL (ref 100–199)
CO2 SERPL-SCNC: 20 MMOL/L (ref 20–29)
CREAT SERPL-MCNC: 1.01 MG/DL (ref 0.76–1.27)
CREAT UR-MCNC: 142.5 MG/DL
GLOBULIN SER CALC-MCNC: 2.9 G/DL (ref 1.5–4.5)
GLUCOSE SERPL-MCNC: 107 MG/DL (ref 65–99)
HBA1C MFR BLD: 6.4 % (ref 4.8–5.6)
HDLC SERPL-MCNC: 30 MG/DL
IF AFRICAN AMERICAN  100797: 98 ML/MIN/1.73
IF NON AFRICAN AMER 100791: 85 ML/MIN/1.73
LABORATORY COMMENT REPORT: ABNORMAL
LDLC SERPL CALC-MCNC: 78 MG/DL (ref 0–99)
MICROALBUMIN UR-MCNC: 3.7 UG/ML
POTASSIUM SERPL-SCNC: 4.6 MMOL/L (ref 3.5–5.2)
PROT SERPL-MCNC: 7.3 G/DL (ref 6–8.5)
PSA SERPL-MCNC: 0.7 NG/ML (ref 0–4)
SODIUM SERPL-SCNC: 140 MMOL/L (ref 134–144)
TRIGL SERPL-MCNC: 143 MG/DL (ref 0–149)
URATE SERPL-MCNC: 9.2 MG/DL (ref 3.7–8.6)
VLDLC SERPL CALC-MCNC: 29 MG/DL (ref 5–40)

## 2018-10-15 NOTE — TELEPHONE ENCOUNTER
Phone Number Called: [phone] (home)     Message: Patient notified of results and scheduled.     Left Message for patient to call back: N\A

## 2018-10-15 NOTE — TELEPHONE ENCOUNTER
----- Message from VAHID Mahmood sent at 10/15/2018  1:47 PM PDT -----  Please have pt set appointment to review and discuss treatment for labs.

## 2018-10-31 ENCOUNTER — OFFICE VISIT (OUTPATIENT)
Dept: MEDICAL GROUP | Facility: MEDICAL CENTER | Age: 53
End: 2018-10-31
Payer: COMMERCIAL

## 2018-10-31 VITALS
DIASTOLIC BLOOD PRESSURE: 78 MMHG | OXYGEN SATURATION: 92 % | HEIGHT: 69 IN | SYSTOLIC BLOOD PRESSURE: 118 MMHG | TEMPERATURE: 97.6 F | HEART RATE: 62 BPM | BODY MASS INDEX: 33.92 KG/M2 | WEIGHT: 229 LBS

## 2018-10-31 DIAGNOSIS — L73.9 FOLLICULITIS: ICD-10-CM

## 2018-10-31 DIAGNOSIS — R73.01 IFG (IMPAIRED FASTING GLUCOSE): ICD-10-CM

## 2018-10-31 DIAGNOSIS — F41.9 ANXIETY: ICD-10-CM

## 2018-10-31 DIAGNOSIS — I10 HYPERTENSION, ESSENTIAL: ICD-10-CM

## 2018-10-31 DIAGNOSIS — Z23 NEED FOR SHINGLES VACCINE: ICD-10-CM

## 2018-10-31 DIAGNOSIS — M1A.00X0 IDIOPATHIC CHRONIC GOUT WITHOUT TOPHUS, UNSPECIFIED SITE: ICD-10-CM

## 2018-10-31 DIAGNOSIS — E78.6 LOW HDL (UNDER 40): ICD-10-CM

## 2018-10-31 PROCEDURE — 99214 OFFICE O/P EST MOD 30 MIN: CPT | Performed by: NURSE PRACTITIONER

## 2018-10-31 RX ORDER — ALPRAZOLAM 0.5 MG/1
0.5 TABLET ORAL NIGHTLY PRN
Qty: 30 TAB | Refills: 0 | Status: SHIPPED | OUTPATIENT
Start: 2018-10-31 | End: 2018-11-30

## 2018-10-31 NOTE — PROGRESS NOTES
Subjective:     Danny Guerrero is a 53 y.o. male who presents with HTN.    HPI:   Seen in f/u for HTN.  He is just back from vacation.  It was relaxing.  He needs a refill on his xanax.  Only uses rarely and gets refill yearly for anxiety.  Stable on meds  He has a lump on his left buttock.  occcurred suddenly.  No pain but notes it when sitting down.  No dg.    He is due for shingrix.  He has a hx of gout.  Usually in big toes.  Last 2 episodes were in left knee. H e treated successfully with tart cherry juice.   Reviewed lab with pt.  His uric acid is high at 9.2.  Not on allopurinol.  2 episodes of gout this summer only.  PSA, alb/cr ratio, GFR IS WNL.  a1c is up from 6.2 to 6.4.  He has already  Been improving his low carb diet.   LP sh ows good trg and LDL.  HDL is chronically low.    CMP is wnl except glucose is elevated.        Patient Active Problem List    Diagnosis Date Noted   • Obesity (BMI 30-39.9) 09/14/2017   • Primary osteoarthritis of left hip 08/11/2017   • Prediabetes 02/24/2016   • Anxiety 02/24/2016   • Vitamin D deficiency    • HTN (hypertension) 02/05/2014   • Glaucoma    • Preventative health care 02/17/2011   • Allergic rhinitis due to allergen 07/01/2010   • Hyperlipidemia 07/07/2009   • GERD (gastroesophageal reflux disease) 07/07/2009   • Kidney stones        Current medicines (including changes today)  Current Outpatient Prescriptions   Medication Sig Dispense Refill   • ALPRAZolam (XANAX) 0.5 MG Tab Take 1 Tab by mouth at bedtime as needed for Sleep or Anxiety for up to 30 days. 30 Tab 0   • Zoster Vac Recomb Adjuvanted (SHINGRIX) 50 MCG Recon Susp 0.5 mL by Intramuscular route Once for 1 dose. 0.5 mL 0   • omeprazole (PRILOSEC) 20 MG delayed-release capsule TAKE 1 CAP BY MOUTH EVERY DAY. 90 Cap 0   • colchicine (COLCRYS) 0.6 MG Tab Take 1 Tab by mouth every day. 3 Tab 0   • lisinopril (PRINIVIL) 20 MG Tab TAKE 1 TAB BY MOUTH EVERY DAY. 90 Tab 3   • atenolol (TENORMIN) 50 MG Tab TAKE 1  "TAB BY MOUTH EVERY DAY. 90 Tab 3   • lovastatin (MEVACOR) 40 MG tablet TAKE 1 TAB BY MOUTH EVERY DAY. IN THE EVENING 90 Tab 3   • latanoprost (XALATAN) 0.005 % Solution PLACE 1 DROP INTO BOTH EYES AT BEDTIME  4   • docosahexanoic acid (FISH OIL) 1000 MG CAPS Take 2,000 mg by mouth every day.       No current facility-administered medications for this visit.        Allergies   Allergen Reactions   • Nkda [No Known Drug Allergy]        ROS  Constitutional: Negative. Negative for fever, chills, weight loss, malaise/fatigue and diaphoresis.   HENT: Negative. Negative for hearing loss, ear pain, nosebleeds, congestion, sore throat, neck pain, tinnitus and ear discharge.   Respiratory: Negative. Negative for cough, hemoptysis, sputum production, shortness of breath, wheezing and stridor.   Cardiovascular: Negative. Negative for chest pain, palpitations, orthopnea, claudication, leg swelling and PND.   Gastrointestinal: Denies nausea, vomiting, diarrhea, constipation, heartburn, melena or hematochezia.  Genitourinary: Denies dysuria, hematuria, urinary incontinence, frequency or urgency.        Objective:     Blood pressure 118/78, pulse 62, temperature 36.4 °C (97.6 °F), temperature source Temporal, height 1.753 m (5' 9\"), weight 103.9 kg (229 lb), SpO2 92 %. Body mass index is 33.82 kg/m².    Physical Exam:  Vitals reviewed.  Constitutional: Oriented to person, place, and time. appears well-developed and well-nourished. No distress.   Cardiovascular: Normal rate, regular rhythm, normal heart sounds and intact distal pulses. Exam reveals no gallop and no friction rub. No murmur heard. No carotid bruits.   Pulmonary/Chest: Effort normal and breath sounds normal. No stridor. No respiratory distress. no wheezes or rales. exhibits no tenderness.   Musculoskeletal: Normal range of motion. exhibits no edema. enid pedal pulses 2+.  Lymphadenopathy: No cervical or supraclavicular adenopathy.   Neurological: Alert and oriented to " person, place, and time. exhibits normal muscle tone.  Skin: Skin is warm and dry. No diaphoresis.  Small folliculitis left buttock with mild erythema and swelling.    Psychiatric: Normal mood and affect. Behavior is normal.      Assessment and Plan:     The following treatment plan was discussed:    1. IFG (impaired fasting glucose)      a1c is sl.  already improved diet and exercise.  plan recheck a1c in 6 months.  call for lab slip   2. Hypertension, essential      stable on meds   3. Low HDL (under 40)      improve diet and exercise.     4. Idiopathic chronic gout without tophus, unspecified site      continue tx with tart cherry juice.  if sx occur freq will start allopurinol.  recheck uric acid 1 yr   5. Folliculitis      left buttock small.  no dg.  di warm moist soaks.  if not better 1 wk call for antibx.     6. BMI 33.0-33.9,adult  Patient identified as having weight management issue.  Appropriate orders and counseling given.   7. Need for shingles vaccine  Zoster Vac Recomb Adjuvanted (SHINGRIX) 50 MCG Recon Susp   8. Anxiety  ALPRAZolam (XANAX) 0.5 MG Tab         Followup: Return in about 6 months (around 4/30/2019).

## 2019-01-10 ENCOUNTER — TELEPHONE (OUTPATIENT)
Dept: MEDICAL GROUP | Facility: MEDICAL CENTER | Age: 54
End: 2019-01-10

## 2019-01-10 DIAGNOSIS — R73.01 IFG (IMPAIRED FASTING GLUCOSE): ICD-10-CM

## 2019-01-10 DIAGNOSIS — M10.00 ACUTE IDIOPATHIC GOUT, UNSPECIFIED SITE: ICD-10-CM

## 2019-01-10 RX ORDER — INDOMETHACIN 75 MG/1
75 CAPSULE, EXTENDED RELEASE ORAL 2 TIMES DAILY
Qty: 6 CAP | Refills: 0 | Status: SHIPPED | OUTPATIENT
Start: 2019-01-10 | End: 2019-12-13

## 2019-01-10 NOTE — TELEPHONE ENCOUNTER
Pt is asking for indocin due to gout flare up. Pt states he is going to make an apt with you soon he is just short staffed at work and is unable to take time out      Was the patient seen in the last year in this department? Yes    Does patient have an active prescription for medications requested? No     Received Request Via: Patient

## 2019-04-23 DIAGNOSIS — F41.9 ANXIETY: ICD-10-CM

## 2019-04-26 RX ORDER — ALPRAZOLAM 0.5 MG/1
TABLET ORAL
Qty: 10 TAB | Refills: 0 | Status: SHIPPED
Start: 2019-04-26 | End: 2019-05-17 | Stop reason: SDUPTHER

## 2019-04-26 NOTE — TELEPHONE ENCOUNTER
presc refilled for a limited amt of med.  Needs lab and appt for further refills. Please fax presc to pharmacy

## 2019-05-10 LAB
HBA1C MFR BLD: 6.1 % (ref 4.8–5.6)
URATE SERPL-MCNC: 8 MG/DL (ref 3.7–8.6)

## 2019-05-11 ENCOUNTER — TELEPHONE (OUTPATIENT)
Dept: MEDICAL GROUP | Facility: MEDICAL CENTER | Age: 54
End: 2019-05-11

## 2019-05-12 NOTE — TELEPHONE ENCOUNTER
Please let pt know that the uric acid is wnl.  His a1c continues to shows pre DM but better than last time.

## 2019-05-17 ENCOUNTER — OFFICE VISIT (OUTPATIENT)
Dept: MEDICAL GROUP | Facility: MEDICAL CENTER | Age: 54
End: 2019-05-17
Payer: COMMERCIAL

## 2019-05-17 VITALS
HEIGHT: 69 IN | BODY MASS INDEX: 29.92 KG/M2 | WEIGHT: 202 LBS | DIASTOLIC BLOOD PRESSURE: 80 MMHG | TEMPERATURE: 97.2 F | HEART RATE: 58 BPM | OXYGEN SATURATION: 98 % | SYSTOLIC BLOOD PRESSURE: 122 MMHG | RESPIRATION RATE: 16 BRPM

## 2019-05-17 DIAGNOSIS — Z79.899 CHRONIC PRESCRIPTION BENZODIAZEPINE USE: ICD-10-CM

## 2019-05-17 DIAGNOSIS — F41.9 ANXIETY: ICD-10-CM

## 2019-05-17 DIAGNOSIS — F33.1 MODERATE EPISODE OF RECURRENT MAJOR DEPRESSIVE DISORDER (HCC): ICD-10-CM

## 2019-05-17 PROCEDURE — 99214 OFFICE O/P EST MOD 30 MIN: CPT | Performed by: NURSE PRACTITIONER

## 2019-05-17 RX ORDER — ESCITALOPRAM OXALATE 10 MG/1
10 TABLET ORAL DAILY
Qty: 30 TAB | Refills: 6 | Status: SHIPPED | OUTPATIENT
Start: 2019-05-17 | End: 2019-12-13 | Stop reason: SDUPTHER

## 2019-05-17 RX ORDER — ALPRAZOLAM 0.5 MG/1
0.5 TABLET ORAL
Qty: 30 TAB | Refills: 2 | Status: SHIPPED
Start: 2019-05-17 | End: 2019-11-21 | Stop reason: SDUPTHER

## 2019-05-17 ASSESSMENT — PATIENT HEALTH QUESTIONNAIRE - PHQ9
7. TROUBLE CONCENTRATING ON THINGS, SUCH AS READING THE NEWSPAPER OR WATCHING TELEVISION: MORE THAN HALF THE DAYS
9. THOUGHTS THAT YOU WOULD BE BETTER OFF DEAD, OR OF HURTING YOURSELF: NOT AT ALL
SUM OF ALL RESPONSES TO PHQ QUESTIONS 1-9: 17
6. FEELING BAD ABOUT YOURSELF - OR THAT YOU ARE A FAILURE OR HAVE LET YOURSELF OR YOUR FAMILY DOWN: MORE THAN HALF THE DAYS
5. POOR APPETITE OR OVEREATING: NEARLY EVERY DAY
3. TROUBLE FALLING OR STAYING ASLEEP OR SLEEPING TOO MUCH: NEARLY EVERY DAY
8. MOVING OR SPEAKING SO SLOWLY THAT OTHER PEOPLE COULD HAVE NOTICED. OR THE OPPOSITE, BEING SO FIGETY OR RESTLESS THAT YOU HAVE BEEN MOVING AROUND A LOT MORE THAN USUAL: MORE THAN HALF THE DAYS
1. LITTLE INTEREST OR PLEASURE IN DOING THINGS: SEVERAL DAYS
4. FEELING TIRED OR HAVING LITTLE ENERGY: MORE THAN HALF THE DAYS
2. FEELING DOWN, DEPRESSED, IRRITABLE, OR HOPELESS: MORE THAN HALF THE DAYS
SUM OF ALL RESPONSES TO PHQ9 QUESTIONS 1 AND 2: 3

## 2019-05-17 NOTE — ASSESSMENT & PLAN NOTE
Patient has been having anhedonia, feeling down/depressed, sleep disturbances, low energy, poor appetite, trouble concentrating, and restless. He is not having SI or HI.     Has never been treated for depression. Feels that his worsening anxiety due to loss of his job is the cause.

## 2019-05-17 NOTE — ASSESSMENT & PLAN NOTE
Has had a history of anxiety attacks, previously happened when he lost his job. Anxiety improved once he got another job. He was using these sparingly until he recently lost his job again. He is currently looking for another job so his stress level and anxiety has been very high again.     Last dose of xanax was yesterday 5/16/19.

## 2019-05-17 NOTE — PROGRESS NOTES
Danny Guerrero is a 53 y.o. male here to establish care and to discuss anxiety medications.  Prior patient of Stella Myers.    HPI:    Anxiety  Has had a history of anxiety attacks, previously happened when he lost his job. Anxiety improved once he got another job. He was using these sparingly until he recently lost his job again. He is currently looking for another job so his stress level and anxiety has been very high again.     Last dose of xanax was yesterday 5/16/19.     Moderate episode of recurrent major depressive disorder (HCC)  Patient has been having anhedonia, feeling down/depressed, sleep disturbances, low energy, poor appetite, trouble concentrating, and restless. He is not having SI or HI.     Has never been treated for depression. Feels that his worsening anxiety due to loss of his job is the cause.     Current medicines (including changes today)  Current Outpatient Prescriptions   Medication Sig Dispense Refill   • escitalopram (LEXAPRO) 10 MG Tab Take 1 Tab by mouth every day. 30 Tab 6   • ALPRAZolam (XANAX) 0.5 MG Tab Take 1 Tab by mouth 1 time daily as needed for Anxiety for up to 30 days. 30 Tab 2   • indomethacin SR (INDOCIN SR) 75 MG Cap CR Take 1 Cap by mouth 2 times a day. 6 Cap 0   • atenolol (TENORMIN) 50 MG Tab TAKE 1 TABLET BY MOUTH EVERY DAY 90 Tab 2   • lovastatin (MEVACOR) 40 MG tablet TAKE 1 TABLET BY MOUTH EVERY DAY IN THE EVENING 90 Tab 2   • omeprazole (PRILOSEC) 20 MG delayed-release capsule TAKE 1 CAPSULE BY MOUTH EVERY DAY 90 Cap 2   • lisinopril (PRINIVIL) 20 MG Tab TAKE 1 TABLET BY MOUTH EVERY DAY 90 Tab 2   • colchicine (COLCRYS) 0.6 MG Tab Take 1 Tab by mouth every day. 3 Tab 0   • lisinopril (PRINIVIL) 20 MG Tab TAKE 1 TAB BY MOUTH EVERY DAY. 90 Tab 3   • atenolol (TENORMIN) 50 MG Tab TAKE 1 TAB BY MOUTH EVERY DAY. 90 Tab 3   • lovastatin (MEVACOR) 40 MG tablet TAKE 1 TAB BY MOUTH EVERY DAY. IN THE EVENING 90 Tab 3   • latanoprost (XALATAN) 0.005 % Solution PLACE 1 DROP INTO  "BOTH EYES AT BEDTIME  4   • docosahexanoic acid (FISH OIL) 1000 MG CAPS Take 2,000 mg by mouth every day.       No current facility-administered medications for this visit.      He  has a past medical history of Allergic rhinitis; Anxiety; GERD (gastroesophageal reflux disease); Glaucoma; HTN; Hyperlipidemia; Impaired fasting glucose; Kidney stones; Primary osteoarthritis of hip; and Vitamin D deficiency. He also has no past medical history of Encounter for long-term (current) use of other medications.  He  has no past surgical history on file.  Social History   Substance Use Topics   • Smoking status: Never Smoker   • Smokeless tobacco: Never Used   • Alcohol use 0.0 oz/week      Comment: rare     Social History     Social History Narrative   • No narrative on file     Family History   Problem Relation Age of Onset   • Hypertension Father      Family Status   Relation Status   • Mo Alive   • Fa Alive   • Sis Alive   • Bro Alive   • MGMo    • MGFa    • PGMo    • PGFa    • Sis Alive   • Bro Alive   • Bro Alive         ROS  No chest pain, no abdominal pain, no rash.  Positive ROS as per HPI.  All other systems reviewed and are negative      Objective:     /80 (BP Location: Right arm, Patient Position: Sitting, BP Cuff Size: Large adult)   Pulse (!) 58   Temp 36.2 °C (97.2 °F) (Temporal)   Resp 16   Ht 1.753 m (5' 9.02\")   Wt 91.6 kg (202 lb)   SpO2 98%  Body mass index is 29.82 kg/m².     Physical Exam:    Constitutional: Alert, no distress.  Skin: Warm, dry, good turgor, no rashes in visible areas.  Eye: Equal, round, conjunctiva clear, lids normal.  ENMT: Lips without lesions, good dentition  Neck: Trachea midline  Respiratory: Unlabored respiratory effort, lungs clear to auscultation, no wheezes, no ronchi.  Cardiovascular: Normal S1, S2, no murmur, no edema.  Psych: Alert and oriented x3, anxious affect and mood.       Assessment and Plan:   The following treatment plan " was discussed    1. Anxiety  Unstable  Begin escitalopram 10 mg daily  Continue alprazolam 0.5 mg as needed for breakthrough anxiety  Controlled substance agreement and UDS done today   reviewed and consistent.  - escitalopram (LEXAPRO) 10 MG Tab; Take 1 Tab by mouth every day.  Dispense: 30 Tab; Refill: 6  - Controlled Substance Treatment Agreement  - PAIN MANAGEMENT SCRN, UR; Future  - ALPRAZolam (XANAX) 0.5 MG Tab; Take 1 Tab by mouth 1 time daily as needed for Anxiety for up to 30 days.  Dispense: 30 Tab; Refill: 2    2. Moderate episode of recurrent major depressive disorder (HCC)  Unstable  Escitalopram 10 mg daily  - escitalopram (LEXAPRO) 10 MG Tab; Take 1 Tab by mouth every day.  Dispense: 30 Tab; Refill: 6    3. Chronic prescription benzodiazepine use  - Controlled Substance Treatment Agreement  - PAIN MANAGEMENT SCRN, UR; Future  - ALPRAZolam (XANAX) 0.5 MG Tab; Take 1 Tab by mouth 1 time daily as needed for Anxiety for up to 30 days.  Dispense: 30 Tab; Refill: 2      Records reviewed  Followup: Return in about 4 weeks (around 6/14/2019) for Depression/Anxiety.    I have placed the below orders and discussed them with an approved delegating provider. The MA is performing the below orders under the direction of Dr. Jay

## 2019-06-14 ENCOUNTER — OFFICE VISIT (OUTPATIENT)
Dept: MEDICAL GROUP | Facility: MEDICAL CENTER | Age: 54
End: 2019-06-14
Payer: COMMERCIAL

## 2019-06-14 VITALS
WEIGHT: 198 LBS | HEART RATE: 52 BPM | SYSTOLIC BLOOD PRESSURE: 124 MMHG | HEIGHT: 69 IN | OXYGEN SATURATION: 95 % | DIASTOLIC BLOOD PRESSURE: 82 MMHG | RESPIRATION RATE: 16 BRPM | BODY MASS INDEX: 29.33 KG/M2 | TEMPERATURE: 98.2 F

## 2019-06-14 DIAGNOSIS — F41.9 ANXIETY: ICD-10-CM

## 2019-06-14 DIAGNOSIS — F33.1 MODERATE EPISODE OF RECURRENT MAJOR DEPRESSIVE DISORDER (HCC): ICD-10-CM

## 2019-06-14 PROCEDURE — 99214 OFFICE O/P EST MOD 30 MIN: CPT | Performed by: NURSE PRACTITIONER

## 2019-06-14 NOTE — ASSESSMENT & PLAN NOTE
Feels like the lexapro started to kick in this past weekend, is waking with less hopelessness and anxiety. Is feeling almost back to normal again. Would like to continue current dose at this time with plan to eventually wean off when life stabilizes.

## 2019-06-14 NOTE — PROGRESS NOTES
Subjective:   Danny Guerrero is a 53 y.o. male here today for follow up on depression    Moderate episode of recurrent major depressive disorder (HCC)  Feels like the lexapro started to kick in this past weekend, is waking with less hopelessness and anxiety. Is feeling almost back to normal again. Would like to continue current dose at this time with plan to eventually wean off when life stabilizes.     Anxiety  Lexapro is starting to work well, has only needed to take one alprazolam this week. Is waking with less anxiety and panic.        Current medicines (including changes today)  Current Outpatient Prescriptions   Medication Sig Dispense Refill   • escitalopram (LEXAPRO) 10 MG Tab Take 1 Tab by mouth every day. 30 Tab 6   • ALPRAZolam (XANAX) 0.5 MG Tab Take 1 Tab by mouth 1 time daily as needed for Anxiety for up to 30 days. 30 Tab 2   • indomethacin SR (INDOCIN SR) 75 MG Cap CR Take 1 Cap by mouth 2 times a day. 6 Cap 0   • atenolol (TENORMIN) 50 MG Tab TAKE 1 TABLET BY MOUTH EVERY DAY 90 Tab 2   • lovastatin (MEVACOR) 40 MG tablet TAKE 1 TABLET BY MOUTH EVERY DAY IN THE EVENING 90 Tab 2   • omeprazole (PRILOSEC) 20 MG delayed-release capsule TAKE 1 CAPSULE BY MOUTH EVERY DAY 90 Cap 2   • lisinopril (PRINIVIL) 20 MG Tab TAKE 1 TABLET BY MOUTH EVERY DAY 90 Tab 2   • colchicine (COLCRYS) 0.6 MG Tab Take 1 Tab by mouth every day. 3 Tab 0   • latanoprost (XALATAN) 0.005 % Solution PLACE 1 DROP INTO BOTH EYES AT BEDTIME  4   • docosahexanoic acid (FISH OIL) 1000 MG CAPS Take 2,000 mg by mouth every day.       No current facility-administered medications for this visit.      He  has a past medical history of Allergic rhinitis; Anxiety; Depression; GERD (gastroesophageal reflux disease); Glaucoma; HTN; Hyperlipidemia; Impaired fasting glucose; Kidney stones; Primary osteoarthritis of hip; and Vitamin D deficiency. He also has no past medical history of Encounter for long-term (current) use of other medications.    ROS  "  No chest pain, no shortness of breath, no abdominal pain  Positive ROS as per HPI.  All other systems reviewed and are negative.     Objective:     /82 (BP Location: Right arm, Patient Position: Sitting, BP Cuff Size: Adult)   Pulse (!) 52   Temp 36.8 °C (98.2 °F) (Temporal)   Resp 16   Ht 1.753 m (5' 9.02\")   Wt 89.8 kg (198 lb)   SpO2 95%  Body mass index is 29.23 kg/m².     Physical Exam:  Constitutional: Alert, no distress.  Skin: Warm, dry, good turgor, no rashes in visible areas.  Eye: Equal, round and reactive, conjunctiva clear, lids normal.  ENMT: Lips without lesions, good dentition, oropharynx clear.  Neck: Trachea midline, no masses, no thyromegaly. No cervical or supraclavicular lymphadenopathy  Respiratory: Unlabored respiratory effort, lungs clear to auscultation, no wheezes, no ronchi.  Cardiovascular: Normal S1, S2, no murmur, no edema.  Psych: Alert and oriented x3, normal affect and mood.      Assessment and Plan:   The following treatment plan was discussed    1. Moderate episode of recurrent major depressive disorder (HCC)  Stable  Continue Lexapro daily    2. Anxiety  Stable  Continue Lexapro daily  Alprazolam as needed, use sparingly, no alcohol    Followup: Return in about 3 months (around 9/14/2019) for Depression/Anxiety.    I have placed the below orders and discussed them with an approved delegating provider. The MA is performing the below orders under the direction of Dr. Jay           "

## 2019-06-14 NOTE — ASSESSMENT & PLAN NOTE
Lexapro is starting to work well, has only needed to take one alprazolam this week. Is waking with less anxiety and panic.

## 2019-11-18 ENCOUNTER — TELEPHONE (OUTPATIENT)
Dept: MEDICAL GROUP | Facility: MEDICAL CENTER | Age: 54
End: 2019-11-18

## 2019-11-18 DIAGNOSIS — E78.5 HYPERLIPIDEMIA, UNSPECIFIED HYPERLIPIDEMIA TYPE: ICD-10-CM

## 2019-11-18 DIAGNOSIS — Z00.00 ANNUAL PHYSICAL EXAM: ICD-10-CM

## 2019-11-18 DIAGNOSIS — I10 ESSENTIAL HYPERTENSION: ICD-10-CM

## 2019-11-18 DIAGNOSIS — Z79.899 CHRONIC PRESCRIPTION BENZODIAZEPINE USE: ICD-10-CM

## 2019-11-18 DIAGNOSIS — E55.9 VITAMIN D DEFICIENCY: ICD-10-CM

## 2019-11-18 DIAGNOSIS — N20.0 KIDNEY STONES: ICD-10-CM

## 2019-11-18 DIAGNOSIS — F41.9 ANXIETY: ICD-10-CM

## 2019-11-18 DIAGNOSIS — R73.03 PREDIABETES: ICD-10-CM

## 2019-11-19 NOTE — TELEPHONE ENCOUNTER
1. Caller Name: Pt                      Call Back Number: [phone] (home) [phone] (work)    2. Message: Please order labs for annual appt 12/13/2019. Patient requesting orders to be faxed to [facility]     3. Patient approves office to leave a detailed voicemail/MyChart message: yes

## 2019-11-19 NOTE — TELEPHONE ENCOUNTER
Was the patient seen in the last year in this department? Yes    Does patient have an active prescription for medications requested? No     Received Request Via: Patient     Patient is scheduled 12/13

## 2019-11-21 RX ORDER — ALPRAZOLAM 0.5 MG/1
0.5 TABLET ORAL
Qty: 30 TAB | Refills: 2 | Status: SHIPPED | OUTPATIENT
Start: 2019-11-21 | End: 2019-12-21

## 2019-11-26 LAB
25(OH)D3+25(OH)D2 SERPL-MCNC: 27.9 NG/ML (ref 30–100)
ALBUMIN SERPL-MCNC: 4.2 G/DL (ref 3.5–5.5)
ALBUMIN/GLOB SERPL: 1.4 {RATIO} (ref 1.2–2.2)
ALP SERPL-CCNC: 62 IU/L (ref 39–117)
ALT SERPL-CCNC: 103 IU/L (ref 0–44)
AMBIG ABBREV CMP14 DFLT   977206: NORMAL
AMBIG ABBREV LP  977212: NORMAL
AST SERPL-CCNC: 59 IU/L (ref 0–40)
BASOPHILS # BLD AUTO: 0 X10E3/UL (ref 0–0.2)
BASOPHILS NFR BLD AUTO: 1 %
BILIRUB SERPL-MCNC: 0.4 MG/DL (ref 0–1.2)
BUN SERPL-MCNC: 15 MG/DL (ref 6–24)
BUN/CREAT SERPL: 14 (ref 9–20)
CALCIUM SERPL-MCNC: 9.5 MG/DL (ref 8.7–10.2)
CHLORIDE SERPL-SCNC: 105 MMOL/L (ref 96–106)
CHOLEST SERPL-MCNC: 153 MG/DL (ref 100–199)
CO2 SERPL-SCNC: 20 MMOL/L (ref 20–29)
CREAT SERPL-MCNC: 1.05 MG/DL (ref 0.76–1.27)
EOSINOPHIL # BLD AUTO: 0.1 X10E3/UL (ref 0–0.4)
EOSINOPHIL NFR BLD AUTO: 3 %
ERYTHROCYTE [DISTWIDTH] IN BLOOD BY AUTOMATED COUNT: 14 % (ref 12.3–15.4)
GLOBULIN SER CALC-MCNC: 3.1 G/DL (ref 1.5–4.5)
GLUCOSE SERPL-MCNC: 115 MG/DL (ref 65–99)
HBA1C MFR BLD: 6 % (ref 4.8–5.6)
HCT VFR BLD AUTO: 45.7 % (ref 37.5–51)
HDLC SERPL-MCNC: 42 MG/DL
HGB BLD-MCNC: 15.5 G/DL (ref 13–17.7)
IMM GRANULOCYTES # BLD AUTO: 0 X10E3/UL (ref 0–0.1)
IMM GRANULOCYTES NFR BLD AUTO: 0 %
IMMATURE CELLS  115398: NORMAL
LABORATORY COMMENT REPORT: NORMAL
LDLC SERPL CALC-MCNC: 91 MG/DL (ref 0–99)
LYMPHOCYTES # BLD AUTO: 1.7 X10E3/UL (ref 0.7–3.1)
LYMPHOCYTES NFR BLD AUTO: 32 %
MCH RBC QN AUTO: 30.2 PG (ref 26.6–33)
MCHC RBC AUTO-ENTMCNC: 33.9 G/DL (ref 31.5–35.7)
MCV RBC AUTO: 89 FL (ref 79–97)
MICROALBUMIN UR-MCNC: <3 UG/ML
MONOCYTES # BLD AUTO: 0.4 X10E3/UL (ref 0.1–0.9)
MONOCYTES NFR BLD AUTO: 7 %
MORPHOLOGY BLD-IMP: NORMAL
NEUTROPHILS # BLD AUTO: 3.1 X10E3/UL (ref 1.4–7)
NEUTROPHILS NFR BLD AUTO: 57 %
NRBC BLD AUTO-RTO: NORMAL %
PLATELET # BLD AUTO: 263 X10E3/UL (ref 150–450)
POTASSIUM SERPL-SCNC: 4.2 MMOL/L (ref 3.5–5.2)
PROT SERPL-MCNC: 7.3 G/DL (ref 6–8.5)
PSA FREE MFR SERPL: 27.8 %
PSA FREE SERPL-MCNC: 0.25 NG/ML
PSA SERPL-MCNC: 0.9 NG/ML (ref 0–4)
RBC # BLD AUTO: 5.13 X10E6/UL (ref 4.14–5.8)
SODIUM SERPL-SCNC: 140 MMOL/L (ref 134–144)
TRIGL SERPL-MCNC: 99 MG/DL (ref 0–149)
TSH SERPL DL<=0.005 MIU/L-ACNC: 3.6 UIU/ML (ref 0.45–4.5)
URATE SERPL-MCNC: 8.5 MG/DL (ref 3.7–8.6)
VLDLC SERPL CALC-MCNC: 20 MG/DL (ref 5–40)
WBC # BLD AUTO: 5.3 X10E3/UL (ref 3.4–10.8)

## 2019-12-13 ENCOUNTER — OFFICE VISIT (OUTPATIENT)
Dept: MEDICAL GROUP | Facility: MEDICAL CENTER | Age: 54
End: 2019-12-13
Payer: COMMERCIAL

## 2019-12-13 VITALS
WEIGHT: 205 LBS | HEART RATE: 74 BPM | BODY MASS INDEX: 30.36 KG/M2 | HEIGHT: 69 IN | TEMPERATURE: 97.4 F | OXYGEN SATURATION: 95 % | SYSTOLIC BLOOD PRESSURE: 122 MMHG | DIASTOLIC BLOOD PRESSURE: 80 MMHG

## 2019-12-13 DIAGNOSIS — E78.5 HYPERLIPIDEMIA LDL GOAL <100: ICD-10-CM

## 2019-12-13 DIAGNOSIS — R73.03 PREDIABETES: ICD-10-CM

## 2019-12-13 DIAGNOSIS — F33.1 MODERATE EPISODE OF RECURRENT MAJOR DEPRESSIVE DISORDER (HCC): ICD-10-CM

## 2019-12-13 DIAGNOSIS — F41.9 ANXIETY: ICD-10-CM

## 2019-12-13 DIAGNOSIS — R79.89 ELEVATED LFTS: ICD-10-CM

## 2019-12-13 DIAGNOSIS — Z23 NEED FOR VACCINATION: ICD-10-CM

## 2019-12-13 DIAGNOSIS — Z11.59 ENCOUNTER FOR HEPATITIS C SCREENING TEST FOR LOW RISK PATIENT: ICD-10-CM

## 2019-12-13 DIAGNOSIS — E66.9 OBESITY (BMI 30-39.9): ICD-10-CM

## 2019-12-13 PROCEDURE — 90471 IMMUNIZATION ADMIN: CPT | Performed by: NURSE PRACTITIONER

## 2019-12-13 PROCEDURE — 99214 OFFICE O/P EST MOD 30 MIN: CPT | Mod: 25 | Performed by: NURSE PRACTITIONER

## 2019-12-13 PROCEDURE — 90686 IIV4 VACC NO PRSV 0.5 ML IM: CPT | Performed by: NURSE PRACTITIONER

## 2019-12-13 RX ORDER — ESCITALOPRAM OXALATE 10 MG/1
5 TABLET ORAL
Qty: 30 TAB | Refills: 0 | Status: SHIPPED | OUTPATIENT
Start: 2019-12-13 | End: 2021-03-17

## 2019-12-13 RX ORDER — LOVASTATIN 40 MG/1
40 TABLET ORAL EVERY EVENING
Qty: 90 TAB | Refills: 3 | Status: SHIPPED | OUTPATIENT
Start: 2019-12-13 | End: 2020-12-14 | Stop reason: SDUPTHER

## 2019-12-13 NOTE — ASSESSMENT & PLAN NOTE
Feeling well. Feels like depression and anxiety are doing better. Now has a job and has not been as stressed as before. He has started taking the lexapro 10 mg every other day to start to wean off to see how his symptoms are. He has felt no different with this. He would like to wean completely off to see how he does.

## 2019-12-16 NOTE — PROGRESS NOTES
Subjective:   Danny Guerrero is a 54 y.o. male here today to discuss depression:    Moderate episode of recurrent major depressive disorder (HCC)  Feeling well. Feels like depression and anxiety are doing better. Now has a job and has not been as stressed as before. He has started taking the lexapro 10 mg every other day to start to wean off to see how his symptoms are. He has felt no different with this. He would like to wean completely off to see how he does.        Current medicines (including changes today)  Current Outpatient Medications   Medication Sig Dispense Refill   • escitalopram (LEXAPRO) 10 MG Tab Take 0.5 Tabs by mouth every 48 hours. 30 Tab 0   • lovastatin (MEVACOR) 40 MG tablet Take 1 Tab by mouth every evening. 90 Tab 3   • ALPRAZolam (XANAX) 0.5 MG Tab Take 1 Tab by mouth 1 time daily as needed for Anxiety for up to 30 days. 30 Tab 2   • atenolol (TENORMIN) 50 MG Tab TAKE 1 TABLET BY MOUTH EVERY DAY 90 Tab 2   • lisinopril (PRINIVIL) 20 MG Tab TAKE 1 TABLET BY MOUTH EVERY DAY 90 Tab 2   • omeprazole (PRILOSEC) 20 MG delayed-release capsule TAKE 1 CAPSULE BY MOUTH EVERY DAY 90 Cap 2   • latanoprost (XALATAN) 0.005 % Solution PLACE 1 DROP INTO BOTH EYES AT BEDTIME  4   • docosahexanoic acid (FISH OIL) 1000 MG CAPS Take 2,000 mg by mouth every day.       No current facility-administered medications for this visit.      He  has a past medical history of Allergic rhinitis, Anxiety, Depression, GERD (gastroesophageal reflux disease), Glaucoma, HTN, Hyperlipidemia, Impaired fasting glucose, Kidney stones, Primary osteoarthritis of hip, and Vitamin D deficiency. He also has no past medical history of Encounter for long-term (current) use of other medications.    ROS  No chest pain, no shortness of breath, no abdominal pain  Positive ROS as per HPI.  All other systems reviewed and are negative.     Objective:     /80 (BP Location: Right arm, Patient Position: Sitting, BP Cuff Size: Adult)   Pulse 74   " Temp 36.3 °C (97.4 °F) (Temporal)   Ht 1.753 m (5' 9\")   Wt 93 kg (205 lb)   SpO2 95%  Body mass index is 30.27 kg/m².     Physical Exam:  Constitutional: Alert, no distress.  Skin: Warm, dry, good turgor, no rashes in visible areas.  Eye: Equal, rounde, conjunctiva clear, lids normal.  ENMT: Lips without lesions, good dentition  Neck: Trachea midline  Respiratory: Unlabored respiratory effort  Cardiovascular: No edema.  Psych: Alert and oriented x3, normal affect and mood.      Assessment and Plan:   The following treatment plan was discussed    1. Moderate episode of recurrent major depressive disorder (HCC)  Stable  Discussed weaning off lexapro, he is currently taking 10 mg every other day and not having any withdrawal symptoms.   Advised decreasing to 5 mg every other day for the next 2-4 weeks, then stop  Return to clinic if depression and anxiety worsen.   - escitalopram (LEXAPRO) 10 MG Tab; Take 0.5 Tabs by mouth every 48 hours.  Dispense: 30 Tab; Refill: 0    2. Anxiety  See #1  - escitalopram (LEXAPRO) 10 MG Tab; Take 0.5 Tabs by mouth every 48 hours.  Dispense: 30 Tab; Refill: 0    3. Hyperlipidemia LDL goal <100  Stable  Continue lovastatin 40 mg daily  - lovastatin (MEVACOR) 40 MG tablet; Take 1 Tab by mouth every evening.  Dispense: 90 Tab; Refill: 3    4. Obesity (BMI 30-39.9)  - Patient identified as having weight management issue.  Appropriate orders and counseling given.    5. Elevated LFTs  Repeat LFTs  - Comp Metabolic Panel; Future    6. Prediabetes  - HEMOGLOBIN A1C; Future    7. Encounter for hepatitis C screening test for low risk patient  - HEP C VIRUS ANTIBODY; Future    8. Need for vaccination  - Influenza Vaccine Quad Injection (PF)      Followup: Return in about 6 months (around 6/13/2020).    I have placed the below orders and discussed them with an approved delegating provider. The MA is performing the below orders under the direction of Dr. Jay           "

## 2020-03-23 ENCOUNTER — TELEPHONE (OUTPATIENT)
Dept: MEDICAL GROUP | Facility: MEDICAL CENTER | Age: 55
End: 2020-03-23

## 2020-03-23 DIAGNOSIS — M10.00 ACUTE IDIOPATHIC GOUT, UNSPECIFIED SITE: ICD-10-CM

## 2020-03-23 RX ORDER — INDOMETHACIN 50 MG/1
50 CAPSULE ORAL 3 TIMES DAILY
Qty: 30 CAP | Refills: 1 | Status: SHIPPED | OUTPATIENT
Start: 2020-03-23 | End: 2021-09-08 | Stop reason: SDUPTHER

## 2020-03-23 NOTE — TELEPHONE ENCOUNTER
----- Message from Sunshine Alves, Med Ass't sent at 3/23/2020  7:37 AM PDT -----  Regarding: FW: Prescription Question  Contact: [phone]    ----- Message -----  From: Danny Guerrero  Sent: 3/22/2020   9:10 AM PDT  To: Krystal Hannon  Subject: Prescription Question                            Marguerite Jimenez     I do not see indomethacine medicine on my refill chart. It may be because I haven't had a four flare up for almost a year due to diet changes.    Unfortunately I am starting to feel a flare up today and was hoping to get a prescription refill. Please. Thank you.

## 2020-05-18 NOTE — ASSESSMENT & PLAN NOTE
For about 2wks   Cannot sleep as result of the cough  Nothing coming up  
Patient is a 51-year-old male who presents to clinic with a chief complaint of cough which is nonproductive for about 2 weeks now. He states sometimes he coughs to the point where he gags, he cannot sleep at night as result. He did have associated generalized malaise which seems to be improving, no fevers no chills no headaches no neck stiffness no double vision, no nausea no vomiting. He is able tolerate by mouth fluids. He has children at home however he became sick before them.  
No

## 2020-06-16 DIAGNOSIS — I10 ESSENTIAL HYPERTENSION: ICD-10-CM

## 2020-06-16 RX ORDER — ATENOLOL 50 MG/1
TABLET ORAL
Qty: 90 TAB | Refills: 0 | Status: SHIPPED | OUTPATIENT
Start: 2020-06-16 | End: 2020-09-14

## 2020-06-16 RX ORDER — LISINOPRIL 20 MG/1
TABLET ORAL
Qty: 90 TAB | Refills: 0 | Status: SHIPPED | OUTPATIENT
Start: 2020-06-16 | End: 2020-09-14

## 2020-09-13 DIAGNOSIS — I10 ESSENTIAL HYPERTENSION: ICD-10-CM

## 2020-09-14 RX ORDER — ATENOLOL 50 MG/1
TABLET ORAL
Qty: 90 TAB | Refills: 0 | Status: SHIPPED | OUTPATIENT
Start: 2020-09-14 | End: 2020-12-14 | Stop reason: SDUPTHER

## 2020-09-14 RX ORDER — LISINOPRIL 20 MG/1
TABLET ORAL
Qty: 90 TAB | Refills: 0 | Status: SHIPPED | OUTPATIENT
Start: 2020-09-14 | End: 2020-12-14 | Stop reason: SDUPTHER

## 2020-09-14 NOTE — TELEPHONE ENCOUNTER
[address] [place] [address] Rd, [place], [place]     AUTHORIZATION FOR SURGICAL OPERATION OR PROCEDURE    I hereby authorize Dr. Rasta Dave MD, my  Physician(s) and whomever may be designated as the doctor's Assistant, to perform the fol Received request via: Pharmacy    Was the patient seen in the last year in this department? Yes    Does the patient have an active prescription (recently filled or refills available) for medication(s) requested? No   4. I consent to the photographing of procedure(s) to be performed for the purposes of advancing medicine, science and/or education, provided my identity is not revealed.  If the procedure has been videotaped, the physician/surgeon will obtain the original v (Witness signature)                                                                                                  (Date)                                (Time)  STATEMENT OF PHYSICIAN My signature below affirms that prior to the time of the procedure;  I

## 2020-12-12 ENCOUNTER — PATIENT MESSAGE (OUTPATIENT)
Dept: MEDICAL GROUP | Facility: MEDICAL CENTER | Age: 55
End: 2020-12-12

## 2020-12-12 DIAGNOSIS — K21.9 GASTROESOPHAGEAL REFLUX DISEASE WITHOUT ESOPHAGITIS: ICD-10-CM

## 2020-12-12 DIAGNOSIS — I10 ESSENTIAL HYPERTENSION: ICD-10-CM

## 2020-12-12 DIAGNOSIS — E55.9 VITAMIN D DEFICIENCY: ICD-10-CM

## 2020-12-12 DIAGNOSIS — Z00.00 ANNUAL PHYSICAL EXAM: ICD-10-CM

## 2020-12-12 DIAGNOSIS — E78.2 MIXED HYPERLIPIDEMIA: ICD-10-CM

## 2020-12-12 DIAGNOSIS — R73.03 PREDIABETES: ICD-10-CM

## 2020-12-12 DIAGNOSIS — E78.5 HYPERLIPIDEMIA LDL GOAL <100: ICD-10-CM

## 2020-12-14 RX ORDER — OMEPRAZOLE 20 MG/1
20 CAPSULE, DELAYED RELEASE ORAL
Qty: 90 CAP | Refills: 0 | Status: SHIPPED | OUTPATIENT
Start: 2020-12-14 | End: 2022-03-14 | Stop reason: SDUPTHER

## 2020-12-14 RX ORDER — LISINOPRIL 20 MG/1
20 TABLET ORAL
Qty: 90 TAB | Refills: 0 | Status: SHIPPED | OUTPATIENT
Start: 2020-12-14 | End: 2021-03-17 | Stop reason: SDUPTHER

## 2020-12-14 RX ORDER — ATENOLOL 50 MG/1
50 TABLET ORAL
Qty: 90 TAB | Refills: 0 | Status: SHIPPED | OUTPATIENT
Start: 2020-12-14 | End: 2021-03-17 | Stop reason: SDUPTHER

## 2020-12-14 RX ORDER — LOVASTATIN 40 MG/1
40 TABLET ORAL EVERY EVENING
Qty: 90 TAB | Refills: 0 | Status: SHIPPED | OUTPATIENT
Start: 2020-12-14 | End: 2021-03-17 | Stop reason: SDUPTHER

## 2020-12-14 NOTE — TELEPHONE ENCOUNTER
From: Danny Guerrero  To: SAILAJA Rashid  Sent: 12/12/2020 8:35 AM PST  Subject: Prescription Question    I noticed I am getting low on four medications and would like to get refills please.    Atenenol  Lovastatin  Lisenaprol  Omeprazol    Is that possible?     My company is changing insurance providers at the beginning of the year so I will be setting up an office visit for my annual exam once I get my insurance card info.

## 2020-12-14 NOTE — TELEPHONE ENCOUNTER
90 day refill given. Patient is due for annual appointment and labs, labs ordered. Please have patient schedule. Virtual is ok.   PRAMOD Rashid.

## 2020-12-14 NOTE — TELEPHONE ENCOUNTER
Pt will try to get labs done before barby of year due to insurance changing. Waiting on scheduling to see if can get it done before 1st of the year

## 2021-03-10 ENCOUNTER — PATIENT OUTREACH (OUTPATIENT)
Dept: MEDICAL GROUP | Facility: MEDICAL CENTER | Age: 56
End: 2021-03-10

## 2021-03-10 LAB
ALBUMIN SERPL-MCNC: 4.4 G/DL (ref 3.8–4.9)
ALBUMIN/CREAT UR: 5 MG/G CREAT (ref 0–29)
ALBUMIN/GLOB SERPL: 1.5 {RATIO} (ref 1.2–2.2)
ALP SERPL-CCNC: 68 IU/L (ref 39–117)
ALT SERPL-CCNC: 26 IU/L (ref 0–44)
AST SERPL-CCNC: 16 IU/L (ref 0–40)
BASOPHILS # BLD AUTO: 0.1 X10E3/UL (ref 0–0.2)
BASOPHILS NFR BLD AUTO: 1 %
BILIRUB SERPL-MCNC: 0.3 MG/DL (ref 0–1.2)
BUN SERPL-MCNC: 21 MG/DL (ref 6–24)
BUN/CREAT SERPL: 19 (ref 9–20)
CALCIUM SERPL-MCNC: 9.7 MG/DL (ref 8.7–10.2)
CHLORIDE SERPL-SCNC: 104 MMOL/L (ref 96–106)
CHOLEST SERPL-MCNC: 159 MG/DL (ref 100–199)
CO2 SERPL-SCNC: 24 MMOL/L (ref 20–29)
CREAT SERPL-MCNC: 1.11 MG/DL (ref 0.76–1.27)
CREAT UR-MCNC: 148 MG/DL
EOSINOPHIL # BLD AUTO: 0.1 X10E3/UL (ref 0–0.4)
EOSINOPHIL NFR BLD AUTO: 2 %
ERYTHROCYTE [DISTWIDTH] IN BLOOD BY AUTOMATED COUNT: 13.2 % (ref 11.6–15.4)
GLOBULIN SER CALC-MCNC: 3 G/DL (ref 1.5–4.5)
GLUCOSE SERPL-MCNC: 106 MG/DL (ref 65–99)
HBA1C MFR BLD: 6.2 % (ref 4.8–5.6)
HCT VFR BLD AUTO: 49.2 % (ref 37.5–51)
HDLC SERPL-MCNC: 42 MG/DL
HGB BLD-MCNC: 15.9 G/DL (ref 13–17.7)
IMM GRANULOCYTES # BLD AUTO: 0 X10E3/UL (ref 0–0.1)
IMM GRANULOCYTES NFR BLD AUTO: 0 %
IMMATURE CELLS  115398: NORMAL
LABORATORY COMMENT REPORT: ABNORMAL
LDLC SERPL CALC-MCNC: 100 MG/DL (ref 0–99)
LYMPHOCYTES # BLD AUTO: 2.3 X10E3/UL (ref 0.7–3.1)
LYMPHOCYTES NFR BLD AUTO: 31 %
MCH RBC QN AUTO: 28.9 PG (ref 26.6–33)
MCHC RBC AUTO-ENTMCNC: 32.3 G/DL (ref 31.5–35.7)
MCV RBC AUTO: 90 FL (ref 79–97)
MICROALBUMIN UR-MCNC: 7.7 UG/ML
MONOCYTES # BLD AUTO: 0.7 X10E3/UL (ref 0.1–0.9)
MONOCYTES NFR BLD AUTO: 9 %
MORPHOLOGY BLD-IMP: NORMAL
NEUTROPHILS # BLD AUTO: 4.4 X10E3/UL (ref 1.4–7)
NEUTROPHILS NFR BLD AUTO: 57 %
NRBC BLD AUTO-RTO: NORMAL %
PLATELET # BLD AUTO: 342 X10E3/UL (ref 150–450)
POTASSIUM SERPL-SCNC: 4.6 MMOL/L (ref 3.5–5.2)
PROT SERPL-MCNC: 7.4 G/DL (ref 6–8.5)
PSA FREE MFR SERPL: 26.4 %
PSA FREE SERPL-MCNC: 0.37 NG/ML
PSA SERPL-MCNC: 1.4 NG/ML (ref 0–4)
RBC # BLD AUTO: 5.5 X10E6/UL (ref 4.14–5.8)
SODIUM SERPL-SCNC: 140 MMOL/L (ref 134–144)
TRIGL SERPL-MCNC: 90 MG/DL (ref 0–149)
VLDLC SERPL CALC-MCNC: 17 MG/DL (ref 5–40)
WBC # BLD AUTO: 7.7 X10E3/UL (ref 3.4–10.8)

## 2021-03-10 NOTE — PROGRESS NOTES
ANNUAL WELLNESS VISIT PRE-VISIT PLANNING    1.  Reviewed notes from the last office visit: Yes    2.  If any orders were ordered or intended to be done prior to visit (i.e. 6 mos follow-up), do we have results/consult notes or has patient scheduled?        •  Labs - Labs ordered, NOT completed.  Note: If patient appointment is for lab review and patient did not complete labs, check with provider if OK to reschedule patient until labs completed.       •  Imaging - Imaging was not ordered at last office visit.       •  Referrals - No referrals were ordered at last office visit.    3.  Immunizations were updated in Epic using Reconcile Outside Information activity? Yes       •  Is patient due for Tdap? YES. Patient was not notified of copay/out of pocket cost.       •  Is patient due for Shingrix? YES. Patient was not notified of copay/out of pocket cost.    4.  Patient is due for the following Health Maintenance Topics:   Health Maintenance Due   Topic Date Due   • HEPATITIS C SCREENING  Never done   • IMM ZOSTER VACCINES (1 of 2) Never done   • IMM INFLUENZA (1) 09/01/2020   • IMM DTaP/Tdap/Td Vaccine (2 - Td) 02/17/2021     5.  Reviewed/Updated the following:       •   Preferred Pharmacy? Yes       •   Preferred Lab? Yes       •   Preferred Communication? Yes       •   Allergies? Yes       •   Medications? YES. Was Abstract Encounter opened and chart updated? YES       •   Social History? Yes       •   Family History (document living status of immediate family members and if + hx of  cancer, diabetes, hypertension, hyperlipidemia, heart attack, stroke) No    6.  Care Team Updated:       •   DME Company (gait device, O2, CPAP, etc.): N\A       •   Other Specialists (eye doctor, derm, GYN, cardiology, endo, etc): N\A    7.  Patient was not advised: “This is a free wellness visit. The provider will screen for medical conditions to help you stay healthy. If you have other concerns to address you may be asked to discuss  these at a separate visit or there may be an additional fee.”     8.  AHA (Puls[employer]) form printed for Provider? N/A         Patient NOT contacted to complete AWV PVP. Information reviewed in chart.   Outside information NOT reconciled using the [employer] feature. Per Rosa Alatorre, the [employer] feature is down as of 02/09/2021 at 2:00pm. Will reconcile outside information at a later date.

## 2021-03-15 DIAGNOSIS — Z23 NEED FOR VACCINATION: ICD-10-CM

## 2021-03-17 ENCOUNTER — OFFICE VISIT (OUTPATIENT)
Dept: MEDICAL GROUP | Facility: MEDICAL CENTER | Age: 56
End: 2021-03-17
Payer: COMMERCIAL

## 2021-03-17 VITALS
TEMPERATURE: 96.6 F | BODY MASS INDEX: 31.35 KG/M2 | HEART RATE: 67 BPM | WEIGHT: 219 LBS | HEIGHT: 70 IN | SYSTOLIC BLOOD PRESSURE: 126 MMHG | DIASTOLIC BLOOD PRESSURE: 78 MMHG | OXYGEN SATURATION: 95 %

## 2021-03-17 DIAGNOSIS — Z00.00 ANNUAL PHYSICAL EXAM: ICD-10-CM

## 2021-03-17 DIAGNOSIS — E78.2 MIXED HYPERLIPIDEMIA: ICD-10-CM

## 2021-03-17 DIAGNOSIS — F41.9 ANXIETY: ICD-10-CM

## 2021-03-17 DIAGNOSIS — R73.03 PREDIABETES: ICD-10-CM

## 2021-03-17 DIAGNOSIS — I10 ESSENTIAL HYPERTENSION: ICD-10-CM

## 2021-03-17 DIAGNOSIS — E66.9 OBESITY (BMI 30-39.9): ICD-10-CM

## 2021-03-17 PROCEDURE — 99396 PREV VISIT EST AGE 40-64: CPT | Performed by: NURSE PRACTITIONER

## 2021-03-17 RX ORDER — ATENOLOL 50 MG/1
50 TABLET ORAL
Qty: 90 TABLET | Refills: 3 | Status: SHIPPED | OUTPATIENT
Start: 2021-03-17 | End: 2022-03-14 | Stop reason: SDUPTHER

## 2021-03-17 RX ORDER — LOVASTATIN 40 MG/1
40 TABLET ORAL EVERY EVENING
Qty: 90 TABLET | Refills: 3 | Status: SHIPPED | OUTPATIENT
Start: 2021-03-17 | End: 2022-03-14 | Stop reason: SDUPTHER

## 2021-03-17 RX ORDER — LISINOPRIL 20 MG/1
20 TABLET ORAL
Qty: 90 TABLET | Refills: 3 | Status: SHIPPED | OUTPATIENT
Start: 2021-03-17 | End: 2022-03-14 | Stop reason: SDUPTHER

## 2021-03-17 RX ORDER — ALPRAZOLAM 0.5 MG/1
0.5 TABLET ORAL NIGHTLY PRN
Qty: 30 TABLET | Refills: 1 | Status: SHIPPED | OUTPATIENT
Start: 2021-03-17 | End: 2021-04-16

## 2021-03-17 ASSESSMENT — PATIENT HEALTH QUESTIONNAIRE - PHQ9
7. TROUBLE CONCENTRATING ON THINGS, SUCH AS READING THE NEWSPAPER OR WATCHING TELEVISION: NOT AT ALL
9. THOUGHTS THAT YOU WOULD BE BETTER OFF DEAD, OR OF HURTING YOURSELF: NOT AT ALL
6. FEELING BAD ABOUT YOURSELF - OR THAT YOU ARE A FAILURE OR HAVE LET YOURSELF OR YOUR FAMILY DOWN: NOT AL ALL
8. MOVING OR SPEAKING SO SLOWLY THAT OTHER PEOPLE COULD HAVE NOTICED. OR THE OPPOSITE, BEING SO FIGETY OR RESTLESS THAT YOU HAVE BEEN MOVING AROUND A LOT MORE THAN USUAL: NOT AT ALL
SUM OF ALL RESPONSES TO PHQ9 QUESTIONS 1 AND 2: 0
1. LITTLE INTEREST OR PLEASURE IN DOING THINGS: NOT AT ALL
3. TROUBLE FALLING OR STAYING ASLEEP OR SLEEPING TOO MUCH: SEVERAL DAYS
SUM OF ALL RESPONSES TO PHQ QUESTIONS 1-9: 2
2. FEELING DOWN, DEPRESSED, IRRITABLE, OR HOPELESS: NOT AT ALL
5. POOR APPETITE OR OVEREATING: NOT AT ALL
4. FEELING TIRED OR HAVING LITTLE ENERGY: SEVERAL DAYS

## 2021-03-17 ASSESSMENT — FIBROSIS 4 INDEX: FIB4 SCORE: 0.5

## 2021-03-17 NOTE — ASSESSMENT & PLAN NOTE
Chronic. Previously on lexapro but is off this now. Feels his day to day anxiety is well managed with lifestyle, however he does have occasional issues with spikes in anxiety causing insomnia for which he uses alprazolam as needed. He is requesting a refill for this today. Using around twice monthly, will take half tablet at night.     Alcohol use is not often.

## 2021-03-17 NOTE — ASSESSMENT & PLAN NOTE
Chronic. Stable on lisinopril 20 mg daily and atenolol 50 mg daily. Checks BP at home occasionally if not feeling well.     Denies chest pain, headache, dizziness.     Established with ophthalmology

## 2021-03-17 NOTE — PROGRESS NOTES
Subjective:   Danny Guerrero is a 55 y.o. male here today for annual, lab review, medication refill    Anxiety  Chronic. Previously on lexapro but is off this now. Feels his day to day anxiety is well managed with lifestyle, however he does have occasional issues with spikes in anxiety causing insomnia for which he uses alprazolam as needed. He is requesting a refill for this today. Using around twice monthly, will take half tablet at night.     Alcohol use is not often.     Prediabetes  Chronic. A1C slightly worse this year. 6.2. No sugary drinks. Having trouble getting away from carbs, working on this.     HTN (hypertension)  Chronic. Stable on lisinopril 20 mg daily and atenolol 50 mg daily. Checks BP at home occasionally if not feeling well.     Denies chest pain, headache, dizziness.     Established with ophthalmology    Hyperlipidemia  Chronic. Stable on          Current medicines (including changes today)  Current Outpatient Medications   Medication Sig Dispense Refill   • ALPRAZolam (XANAX) 0.5 MG Tab Take 1 tablet by mouth at bedtime as needed for Sleep or Anxiety for up to 30 days. 30 tablet 1   • lisinopril (PRINIVIL) 20 MG Tab Take 1 tablet by mouth every day. 90 tablet 3   • atenolol (TENORMIN) 50 MG Tab Take 1 tablet by mouth every day. 90 tablet 3   • lovastatin (MEVACOR) 40 MG tablet Take 1 tablet by mouth every evening. 90 tablet 3   • omeprazole (PRILOSEC) 20 MG delayed-release capsule Take 1 Cap by mouth every day. 90 Cap 0   • indomethacin (INDOCIN) 50 MG Cap Take 1 Cap by mouth 3 times a day. For about 5-7 days. Discontinue 2-3 days after resolution of symptoms. 30 Cap 1   • latanoprost (XALATAN) 0.005 % Solution PLACE 1 DROP INTO BOTH EYES AT BEDTIME  4   • docosahexanoic acid (FISH OIL) 1000 MG CAPS Take 2,000 mg by mouth every day.       No current facility-administered medications for this visit.     He  has a past medical history of Allergic rhinitis, Anxiety, Depression, GERD  "(gastroesophageal reflux disease), Glaucoma, HTN, Hyperlipidemia, Impaired fasting glucose, Kidney stones, Primary osteoarthritis of hip, and Vitamin D deficiency. He also has no past medical history of Encounter for long-term (current) use of other medications.    ROS   No chest pain, no shortness of breath, no abdominal pain  Positive ROS as per HPI.  All other systems reviewed and are negative.     Objective:     /78 (BP Location: Right arm, Patient Position: Sitting, BP Cuff Size: Adult)   Pulse 67   Temp 35.9 °C (96.6 °F) (Temporal)   Ht 1.778 m (5' 10\")   Wt 99.3 kg (219 lb)   SpO2 95%  Body mass index is 31.42 kg/m².     Physical Exam:  Constitutional: Alert, no distress.  Skin: Warm, dry, good turgor, no rashes in visible areas.  Eye: Equal, round and reactive, conjunctiva clear, lids normal.  ENMT: Mask in place  Neck: Trachea midline, no masses, no thyromegaly. No cervical or supraclavicular lymphadenopathy  Respiratory: Unlabored respiratory effort, lungs clear to auscultation, no wheezes, no ronchi.  Cardiovascular: Normal S1, S2, no murmur, no edema.  Abdomen: Soft, non-tender, no masses  Psych: Alert and oriented x3, normal affect and mood.        Assessment and Plan:   The following treatment plan was discussed    1. Annual physical exam  Patient and I discussed the importance of lifestyle changes, with particular emphasis on decreasing sugar and carbohydrate intake and increasing plant-based nutrition (for the purposes of weight loss, general health, and prevention of chronic illnesses), as well as regular cardiovascular exercise, proper sleep, and stress management. Patient verbalized understanding.    2. Anxiety  Stable  Off Lexapro  Continue alprazolam sparingly,  reviewed and consistent. Last refill of 30 tabs April 2020  No ETOH with medication  - ALPRAZolam (XANAX) 0.5 MG Tab; Take 1 tablet by mouth at bedtime as needed for Sleep or Anxiety for up to 30 days.  Dispense: 30 tablet; " Refill: 1    3. Prediabetes  Unstable  Diet and exercise    4. Essential hypertension  Stable  Continue lisinopril 20 mg daily and atenolol 50 mg daily  - lisinopril (PRINIVIL) 20 MG Tab; Take 1 tablet by mouth every day.  Dispense: 90 tablet; Refill: 3  - atenolol (TENORMIN) 50 MG Tab; Take 1 tablet by mouth every day.  Dispense: 90 tablet; Refill: 3    5. Mixed hyperlipidemia  Stable  Continue lovastatin 40 mg daily  - lovastatin (MEVACOR) 40 MG tablet; Take 1 tablet by mouth every evening.  Dispense: 90 tablet; Refill: 3      Followup: Return in about 1 year (around 3/17/2022), or if symptoms worsen or fail to improve.    I have placed the below orders and discussed them with an approved delegating provider. The MA is performing the below orders under the direction of Dr. Damon

## 2021-03-17 NOTE — ASSESSMENT & PLAN NOTE
Chronic. A1C slightly worse this year. 6.2. No sugary drinks. Having trouble getting away from carbs, working on this.

## 2021-04-28 ENCOUNTER — IMMUNIZATION (OUTPATIENT)
Dept: FAMILY PLANNING/WOMEN'S HEALTH CLINIC | Facility: IMMUNIZATION CENTER | Age: 56
End: 2021-04-28
Payer: COMMERCIAL

## 2021-04-28 DIAGNOSIS — Z23 ENCOUNTER FOR VACCINATION: Primary | ICD-10-CM

## 2021-04-28 PROCEDURE — 0001A PFIZER SARS-COV-2 VACCINE: CPT

## 2021-04-28 PROCEDURE — 91300 PFIZER SARS-COV-2 VACCINE: CPT

## 2021-05-05 ENCOUNTER — NURSE TRIAGE (OUTPATIENT)
Dept: HEALTH INFORMATION MANAGEMENT | Facility: OTHER | Age: 56
End: 2021-05-05

## 2021-05-05 ENCOUNTER — OFFICE VISIT (OUTPATIENT)
Dept: MEDICAL GROUP | Facility: MEDICAL CENTER | Age: 56
End: 2021-05-05
Payer: COMMERCIAL

## 2021-05-05 VITALS
DIASTOLIC BLOOD PRESSURE: 76 MMHG | TEMPERATURE: 99.5 F | OXYGEN SATURATION: 95 % | SYSTOLIC BLOOD PRESSURE: 120 MMHG | HEART RATE: 80 BPM | HEIGHT: 70 IN | WEIGHT: 213 LBS | BODY MASS INDEX: 30.49 KG/M2

## 2021-05-05 DIAGNOSIS — M79.602 LEFT ARM PAIN: ICD-10-CM

## 2021-05-05 PROCEDURE — 99214 OFFICE O/P EST MOD 30 MIN: CPT | Performed by: NURSE PRACTITIONER

## 2021-05-05 RX ORDER — NAPROXEN 500 MG/1
500 TABLET ORAL 2 TIMES DAILY WITH MEALS
Qty: 14 TABLET | Refills: 1 | Status: SHIPPED | OUTPATIENT
Start: 2021-05-05 | End: 2022-04-13

## 2021-05-05 RX ORDER — TIZANIDINE 4 MG/1
4 TABLET ORAL NIGHTLY PRN
Qty: 7 TABLET | Refills: 0 | Status: SHIPPED | OUTPATIENT
Start: 2021-05-05 | End: 2022-04-13

## 2021-05-05 ASSESSMENT — FIBROSIS 4 INDEX: FIB4 SCORE: 0.5

## 2021-05-05 NOTE — ASSESSMENT & PLAN NOTE
Got COVID vaccine on 4/28. Developed arm pain around Sunday night which was severe. Pain hasn't improved since then. Only got about 3 hours of sleep last night.     Did try icing last night which helped while ice was on, but worsened again once removed.     Took Advil 400 mg Monday twice that day 7am and 7 pm, didn't seem to help. Took tylenol as well last night at 6pm and 1030pm which didn't help.     Denies arm swelling. Pain shoots down arm, has less  strength.

## 2021-05-05 NOTE — TELEPHONE ENCOUNTER
"Received Pfizer Covid vaccine on 4/28/21 Sunday night, pts started having LEFT arm pain, and unable to move it.      Currently taking tylenol and applying ice w/minimal relief.     Pt denies swelling/redness to injection sit.    Pt wants to see PCP today, appt made.               Reason for Disposition  • [1] Pain, tenderness, or swelling at the injection site AND [2] persists > 3 days    Additional Information  • Negative: [1] Difficulty with breathing or swallowing AND [2] starts within 2 hours after injection  • Negative: Difficult to awaken or acting confused (e.g., disoriented, slurred speech)  • Negative: Unresponsive, passed out, or very weak  • Negative: Sounds like a life-threatening emergency to the triager  • Negative: Fever > 104 F (40 C)  • Negative: [1] Fever > 101 F (38.3 C) AND [2] age > 60  • Negative: [1] Fever > 100.0 F (37.8 C) AND [2] bedridden (e.g., nursing home patient, CVA, chronic illness, recovering from surgery)  • Negative: [1] Fever > 100.0 F (37.8 C) AND [2] diabetes mellitus or weak immune system (e.g., HIV positive, cancer chemo, splenectomy, organ transplant, chronic steroids)  • Negative: [1] Measles vaccine rash (onset day 6-12) AND [2] purple or blood-colored  • Negative: Sounds like a severe, unusual reaction to the triager  • Negative: [1] Redness or red streak around the injection site AND [2] begins > 48 hours after shot AND [3] fever  • Negative: [1] Redness or red streak around the injection site AND [2] begins > 48 hours after shot AND [3] no fever  (Exception: red area < 1 inch or 2.5 cm wide)  • Negative: Fever present > 3 days (72 hours)  • Negative: [1] Over 3 days (72 hours) since shot AND [2] redness, swelling or pain getting worse  • Negative: [1] Smallpox vaccine and [2] eye pain, eye redness, or rash on eyelids    Answer Assessment - Initial Assessment Questions  1. SYMPTOMS: \"What is the main symptom?\" (e.g., redness, swelling, pain)       RIGHT arm pain  2. " "ONSET: \"When was the vaccine (shot) given?\" \"How much later did the 3 days__ begin?\" (e.g., hours, days ago)       Since Sunday  3. SEVERITY: \"How bad is it?\"       10/10 w/movement,  4. FEVER: \"Is there a fever?\" If so, ask: \"What is it, how was it measured, and when did it start?\"       No  5. IMMUNIZATIONS GIVEN: \"What shots have you recently received?\"      COVID vaccine Pfizer  6. PAST REACTIONS: \"Have you reacted to immunizations before?\" If so, ask: \"What happened?\"      No  7. OTHER SYMPTOMS: \"Do you have any other symptoms?\"      No    Protocols used: IMMUNIZATION [id number]      "

## 2021-05-06 NOTE — PROGRESS NOTES
Subjective:   Danny Guerrero is a 55 y.o. male here today for arm pain after COVID vaccine    Left arm pain  Got COVID vaccine on 4/28. Developed arm pain around Sunday night which was severe. Pain hasn't improved since then. Only got about 3 hours of sleep last night.     Did try icing last night which helped while ice was on, but worsened again once removed.     Took Advil 400 mg Monday twice that day 7am and 7 pm, didn't seem to help. Took tylenol as well last night at 6pm and 1030pm which didn't help.     Denies arm swelling. Pain shoots down arm, has less  strength.        Current medicines (including changes today)  Current Outpatient Medications   Medication Sig Dispense Refill   • naproxen (NAPROSYN) 500 MG Tab Take 1 tablet by mouth 2 times a day with meals. 14 tablet 1   • tizanidine (ZANAFLEX) 4 MG Tab Take 1 tablet by mouth at bedtime as needed (Muscle spasm). 7 tablet 0   • lisinopril (PRINIVIL) 20 MG Tab Take 1 tablet by mouth every day. 90 tablet 3   • atenolol (TENORMIN) 50 MG Tab Take 1 tablet by mouth every day. 90 tablet 3   • lovastatin (MEVACOR) 40 MG tablet Take 1 tablet by mouth every evening. 90 tablet 3   • omeprazole (PRILOSEC) 20 MG delayed-release capsule Take 1 Cap by mouth every day. 90 Cap 0   • indomethacin (INDOCIN) 50 MG Cap Take 1 Cap by mouth 3 times a day. For about 5-7 days. Discontinue 2-3 days after resolution of symptoms. 30 Cap 1   • latanoprost (XALATAN) 0.005 % Solution PLACE 1 DROP INTO BOTH EYES AT BEDTIME  4   • docosahexanoic acid (FISH OIL) 1000 MG CAPS Take 2,000 mg by mouth every day.       No current facility-administered medications for this visit.     He  has a past medical history of Allergic rhinitis, Anxiety, Depression, GERD (gastroesophageal reflux disease), Glaucoma, HTN, Hyperlipidemia, Impaired fasting glucose, Kidney stones, Primary osteoarthritis of hip, and Vitamin D deficiency. He also has no past medical history of Encounter for long-term (current)  "use of other medications.    ROS   No chest pain, no shortness of breath, no abdominal pain  Positive ROS as per HPI.  All other systems reviewed and are negative.     Objective:     /76 (BP Location: Right arm, Patient Position: Sitting, BP Cuff Size: Adult)   Pulse 80   Temp 37.5 °C (99.5 °F) (Temporal)   Ht 1.778 m (5' 10\")   Wt 96.6 kg (213 lb)   SpO2 95%  Body mass index is 30.56 kg/m².     Physical Exam:  Constitutional: Alert, no distress.  Skin: Warm, dry, good turgor, no rashes in visible areas. No erythema, induration, or edema of left deltoid.   Eye: Equal, round and reactive, conjunctiva clear, lids normal.  ENMT: Lips without lesions, good dentition, oropharynx clear.  Neck: Trachea midline, no masses, no thyromegaly. No cervical or supraclavicular lymphadenopathy  Respiratory: Unlabored respiratory effort  Psych: Alert and oriented x3, normal affect and mood.      Assessment and Plan:   The following treatment plan was discussed    1. Left arm pain  Stable  Likely side effect of COVID vaccination  Advised ice several times per day, naproxen BID as needed for pain  Tizanidine at night to help with sleep and muscle spasm  - naproxen (NAPROSYN) 500 MG Tab; Take 1 tablet by mouth 2 times a day with meals.  Dispense: 14 tablet; Refill: 1  - tizanidine (ZANAFLEX) 4 MG Tab; Take 1 tablet by mouth at bedtime as needed (Muscle spasm).  Dispense: 7 tablet; Refill: 0      Followup: Return if symptoms worsen or fail to improve.    I have placed the below orders and discussed them with an approved delegating provider. The MA is performing the below orders under the direction of Dr. Damon             "

## 2021-05-20 ENCOUNTER — IMMUNIZATION (OUTPATIENT)
Dept: FAMILY PLANNING/WOMEN'S HEALTH CLINIC | Facility: IMMUNIZATION CENTER | Age: 56
End: 2021-05-20
Attending: INTERNAL MEDICINE
Payer: COMMERCIAL

## 2021-05-20 DIAGNOSIS — Z23 ENCOUNTER FOR VACCINATION: Primary | ICD-10-CM

## 2021-05-20 PROCEDURE — 0002A PFIZER SARS-COV-2 VACCINE: CPT

## 2021-05-20 PROCEDURE — 91300 PFIZER SARS-COV-2 VACCINE: CPT

## 2021-09-08 ENCOUNTER — TELEPHONE (OUTPATIENT)
Dept: MEDICAL GROUP | Facility: MEDICAL CENTER | Age: 56
End: 2021-09-08

## 2021-09-08 DIAGNOSIS — M10.00 ACUTE IDIOPATHIC GOUT, UNSPECIFIED SITE: ICD-10-CM

## 2021-09-08 RX ORDER — INDOMETHACIN 50 MG/1
50 CAPSULE ORAL 3 TIMES DAILY
Qty: 30 CAPSULE | Refills: 1 | Status: SHIPPED | OUTPATIENT
Start: 2021-09-08 | End: 2022-04-13 | Stop reason: SDUPTHER

## 2021-09-09 NOTE — TELEPHONE ENCOUNTER
Gout medication refilled. Please have patient schedule virtual or in person for xanax refill.     PRAMOD Rashid.

## 2021-10-11 ENCOUNTER — OFFICE VISIT (OUTPATIENT)
Dept: URGENT CARE | Facility: CLINIC | Age: 56
End: 2021-10-11
Payer: COMMERCIAL

## 2021-10-11 VITALS
HEART RATE: 64 BPM | HEIGHT: 70 IN | SYSTOLIC BLOOD PRESSURE: 122 MMHG | OXYGEN SATURATION: 95 % | BODY MASS INDEX: 31.5 KG/M2 | TEMPERATURE: 98.5 F | RESPIRATION RATE: 16 BRPM | DIASTOLIC BLOOD PRESSURE: 86 MMHG | WEIGHT: 220 LBS

## 2021-10-11 DIAGNOSIS — L23.9 ALLERGIC DERMATITIS: ICD-10-CM

## 2021-10-11 PROCEDURE — 99213 OFFICE O/P EST LOW 20 MIN: CPT | Performed by: FAMILY MEDICINE

## 2021-10-11 RX ORDER — PREDNISONE 20 MG/1
20 TABLET ORAL DAILY
Qty: 5 TABLET | Refills: 0 | Status: SHIPPED | OUTPATIENT
Start: 2021-10-11 | End: 2021-10-16

## 2021-10-11 ASSESSMENT — ENCOUNTER SYMPTOMS
COUGH: 0
FEVER: 0
SORE THROAT: 0
VOMITING: 0

## 2021-10-11 ASSESSMENT — FIBROSIS 4 INDEX: FIB4 SCORE: 0.5

## 2021-10-11 NOTE — PROGRESS NOTES
Subjective:     Danny Guerrero is a 55 y.o. male who presents for Rash (x5 days, hand, neck, chest area, very itchy )    HPI  Pt presents for evaluation of an acute problem  Pt with itchy rash on hands, neck, and chest   Rash started on hands and slowly spread  Rash started while he was on vacation in [place]  Has had similar rashes in the past  Does have history of allergies  Does have history of sensitive skin  Unsure what he may have been exposed to which started the rash    Review of Systems   Constitutional: Negative for fever.   HENT: Negative for sore throat.    Respiratory: Negative for cough.    Gastrointestinal: Negative for vomiting.   Skin: Positive for itching and rash.       PMH:  has a past medical history of Allergic rhinitis, Anxiety, Depression, GERD (gastroesophageal reflux disease), Glaucoma, HTN, Hyperlipidemia, Impaired fasting glucose, Kidney stones, Primary osteoarthritis of hip, and Vitamin D deficiency. He also has no past medical history of Encounter for long-term (current) use of other medications.  MEDS:   Current Outpatient Medications:   •  indomethacin (INDOCIN) 50 MG Cap, Take 1 Capsule by mouth 3 times a day. For about 5-7 days. Discontinue 2-3 days after resolution of symptoms., Disp: 30 Capsule, Rfl: 1  •  naproxen (NAPROSYN) 500 MG Tab, Take 1 tablet by mouth 2 times a day with meals., Disp: 14 tablet, Rfl: 1  •  tizanidine (ZANAFLEX) 4 MG Tab, Take 1 tablet by mouth at bedtime as needed (Muscle spasm)., Disp: 7 tablet, Rfl: 0  •  lisinopril (PRINIVIL) 20 MG Tab, Take 1 tablet by mouth every day., Disp: 90 tablet, Rfl: 3  •  atenolol (TENORMIN) 50 MG Tab, Take 1 tablet by mouth every day., Disp: 90 tablet, Rfl: 3  •  lovastatin (MEVACOR) 40 MG tablet, Take 1 tablet by mouth every evening., Disp: 90 tablet, Rfl: 3  •  omeprazole (PRILOSEC) 20 MG delayed-release capsule, Take 1 Cap by mouth every day., Disp: 90 Cap, Rfl: 0  •  latanoprost (XALATAN) 0.005 % Solution, PLACE 1 DROP INTO  "BOTH EYES AT BEDTIME, Disp: , Rfl: 4  •  docosahexanoic acid (FISH OIL) 1000 MG CAPS, Take 2,000 mg by mouth every day., Disp: , Rfl:   ALLERGIES:   Allergies   Allergen Reactions   • Nkda [No Known Drug Allergy]      SURGHX: No past surgical history on file.  SOCHX:  reports that he has never smoked. He has never used smokeless tobacco. He reports current alcohol use. He reports that he does not use drugs.     Objective:   /86   Pulse 64   Temp 36.9 °C (98.5 °F) (Temporal)   Resp 16   Ht 1.778 m (5' 10\")   Wt 99.8 kg (220 lb)   SpO2 95%   BMI 31.57 kg/m²     Physical Exam  Constitutional:       General: He is not in acute distress.     Appearance: He is well-developed. He is not diaphoretic.   Pulmonary:      Effort: Pulmonary effort is normal.   Neurological:      Mental Status: He is alert.     Erythematous skin over the bilateral dorsal hands, chest, and right anterior neck    Assessment/Plan:   Assessment    1. Allergic dermatitis  - predniSONE (DELTASONE) 20 MG Tab; Take 1 Tablet by mouth every day for 5 days.  Dispense: 5 Tablet; Refill: 0    Patient with allergic dermatitis.  Happened after a trip and concern for allergy to either hotel soap/shampoo or possibly laundry detergent.  Advised that medications should be able to resolve symptoms, however may return if exposed to same allergen again.  Did recommend discussing possible referral to an allergist with his PCP.  Patient is agreeable and all questions answered.      "

## 2022-03-14 ENCOUNTER — TELEPHONE (OUTPATIENT)
Dept: MEDICAL GROUP | Facility: MEDICAL CENTER | Age: 57
End: 2022-03-14
Payer: COMMERCIAL

## 2022-03-14 DIAGNOSIS — I10 ESSENTIAL HYPERTENSION: ICD-10-CM

## 2022-03-14 DIAGNOSIS — K21.9 GASTROESOPHAGEAL REFLUX DISEASE WITHOUT ESOPHAGITIS: ICD-10-CM

## 2022-03-14 DIAGNOSIS — Z00.00 ANNUAL PHYSICAL EXAM: ICD-10-CM

## 2022-03-14 DIAGNOSIS — R73.03 PREDIABETES: ICD-10-CM

## 2022-03-14 DIAGNOSIS — F41.9 ANXIETY: ICD-10-CM

## 2022-03-14 DIAGNOSIS — E78.2 MIXED HYPERLIPIDEMIA: ICD-10-CM

## 2022-03-14 RX ORDER — ALPRAZOLAM 0.5 MG/1
0.5 TABLET ORAL NIGHTLY PRN
COMMUNITY
End: 2022-03-14 | Stop reason: SDUPTHER

## 2022-03-14 NOTE — TELEPHONE ENCOUNTER
1. Caller Name: Danny Guerrero  [phone]    Pt requesting orders for annual labs. He does not want to do Vitamin D lab because he had to pay out of pocket last year. Please advise.

## 2022-03-16 RX ORDER — LOVASTATIN 40 MG/1
40 TABLET ORAL EVERY EVENING
Qty: 90 TABLET | Refills: 0 | Status: SHIPPED | OUTPATIENT
Start: 2022-03-16 | End: 2022-06-22

## 2022-03-16 RX ORDER — ATENOLOL 50 MG/1
50 TABLET ORAL
Qty: 90 TABLET | Refills: 0 | Status: SHIPPED | OUTPATIENT
Start: 2022-03-16 | End: 2022-06-22

## 2022-03-16 RX ORDER — LISINOPRIL 20 MG/1
20 TABLET ORAL
Qty: 90 TABLET | Refills: 0 | Status: SHIPPED | OUTPATIENT
Start: 2022-03-16 | End: 2022-06-22

## 2022-03-16 RX ORDER — ALPRAZOLAM 0.5 MG/1
0.5 TABLET ORAL NIGHTLY PRN
Qty: 30 TABLET | Refills: 0 | Status: SHIPPED | OUTPATIENT
Start: 2022-03-16 | End: 2023-02-24 | Stop reason: SDUPTHER

## 2022-03-16 RX ORDER — OMEPRAZOLE 20 MG/1
20 CAPSULE, DELAYED RELEASE ORAL
Qty: 90 CAPSULE | Refills: 0 | Status: SHIPPED | OUTPATIENT
Start: 2022-03-16 | End: 2022-06-15

## 2022-04-09 ENCOUNTER — HOSPITAL ENCOUNTER (OUTPATIENT)
Dept: LAB | Facility: MEDICAL CENTER | Age: 57
End: 2022-04-09
Attending: NURSE PRACTITIONER
Payer: COMMERCIAL

## 2022-04-09 DIAGNOSIS — I10 ESSENTIAL HYPERTENSION: ICD-10-CM

## 2022-04-09 DIAGNOSIS — Z00.00 ANNUAL PHYSICAL EXAM: ICD-10-CM

## 2022-04-09 DIAGNOSIS — R73.03 PREDIABETES: ICD-10-CM

## 2022-04-09 DIAGNOSIS — E78.2 MIXED HYPERLIPIDEMIA: ICD-10-CM

## 2022-04-09 LAB
ALBUMIN SERPL BCP-MCNC: 4 G/DL (ref 3.2–4.9)
ALBUMIN/GLOB SERPL: 1.2 G/DL
ALP SERPL-CCNC: 62 U/L (ref 30–99)
ALT SERPL-CCNC: 12 U/L (ref 2–50)
ANION GAP SERPL CALC-SCNC: 11 MMOL/L (ref 7–16)
AST SERPL-CCNC: 6 U/L (ref 12–45)
BASOPHILS # BLD AUTO: 0.7 % (ref 0–1.8)
BASOPHILS # BLD: 0.06 K/UL (ref 0–0.12)
BILIRUB SERPL-MCNC: 0.4 MG/DL (ref 0.1–1.5)
BUN SERPL-MCNC: 14 MG/DL (ref 8–22)
CALCIUM SERPL-MCNC: 9.3 MG/DL (ref 8.5–10.5)
CHLORIDE SERPL-SCNC: 101 MMOL/L (ref 96–112)
CHOLEST SERPL-MCNC: 123 MG/DL (ref 100–199)
CO2 SERPL-SCNC: 22 MMOL/L (ref 20–33)
CREAT SERPL-MCNC: 1.11 MG/DL (ref 0.5–1.4)
CREAT UR-MCNC: 92.55 MG/DL
EOSINOPHIL # BLD AUTO: 0.18 K/UL (ref 0–0.51)
EOSINOPHIL NFR BLD: 2.1 % (ref 0–6.9)
ERYTHROCYTE [DISTWIDTH] IN BLOOD BY AUTOMATED COUNT: 42.4 FL (ref 35.9–50)
EST. AVERAGE GLUCOSE BLD GHB EST-MCNC: 137 MG/DL
GFR SERPLBLD CREATININE-BSD FMLA CKD-EPI: 78 ML/MIN/1.73 M 2
GLOBULIN SER CALC-MCNC: 3.3 G/DL (ref 1.9–3.5)
GLUCOSE SERPL-MCNC: 91 MG/DL (ref 65–99)
HBA1C MFR BLD: 6.4 % (ref 4–5.6)
HCT VFR BLD AUTO: 43.8 % (ref 42–52)
HDLC SERPL-MCNC: 34 MG/DL
HGB BLD-MCNC: 14.1 G/DL (ref 14–18)
IMM GRANULOCYTES # BLD AUTO: 0.02 K/UL (ref 0–0.11)
IMM GRANULOCYTES NFR BLD AUTO: 0.2 % (ref 0–0.9)
LDLC SERPL CALC-MCNC: 71 MG/DL
LYMPHOCYTES # BLD AUTO: 2.97 K/UL (ref 1–4.8)
LYMPHOCYTES NFR BLD: 33.9 % (ref 22–41)
MCH RBC QN AUTO: 28.5 PG (ref 27–33)
MCHC RBC AUTO-ENTMCNC: 32.2 G/DL (ref 33.7–35.3)
MCV RBC AUTO: 88.5 FL (ref 81.4–97.8)
MICROALBUMIN UR-MCNC: <1.2 MG/DL
MICROALBUMIN/CREAT UR: NORMAL MG/G (ref 0–30)
MONOCYTES # BLD AUTO: 0.74 K/UL (ref 0–0.85)
MONOCYTES NFR BLD AUTO: 8.4 % (ref 0–13.4)
NEUTROPHILS # BLD AUTO: 4.8 K/UL (ref 1.82–7.42)
NEUTROPHILS NFR BLD: 54.7 % (ref 44–72)
NRBC # BLD AUTO: 0 K/UL
NRBC BLD-RTO: 0 /100 WBC
PLATELET # BLD AUTO: 317 K/UL (ref 164–446)
PMV BLD AUTO: 10.4 FL (ref 9–12.9)
POTASSIUM SERPL-SCNC: 4.2 MMOL/L (ref 3.6–5.5)
PROT SERPL-MCNC: 7.3 G/DL (ref 6–8.2)
RBC # BLD AUTO: 4.95 M/UL (ref 4.7–6.1)
SODIUM SERPL-SCNC: 134 MMOL/L (ref 135–145)
TRIGL SERPL-MCNC: 90 MG/DL (ref 0–149)
TSH SERPL DL<=0.005 MIU/L-ACNC: 3.61 UIU/ML (ref 0.38–5.33)
WBC # BLD AUTO: 8.8 K/UL (ref 4.8–10.8)

## 2022-04-09 PROCEDURE — 84443 ASSAY THYROID STIM HORMONE: CPT

## 2022-04-09 PROCEDURE — 80053 COMPREHEN METABOLIC PANEL: CPT

## 2022-04-09 PROCEDURE — 85025 COMPLETE CBC W/AUTO DIFF WBC: CPT

## 2022-04-09 PROCEDURE — 36415 COLL VENOUS BLD VENIPUNCTURE: CPT

## 2022-04-09 PROCEDURE — 82043 UR ALBUMIN QUANTITATIVE: CPT

## 2022-04-09 PROCEDURE — 83036 HEMOGLOBIN GLYCOSYLATED A1C: CPT

## 2022-04-09 PROCEDURE — 84154 ASSAY OF PSA FREE: CPT

## 2022-04-09 PROCEDURE — 80061 LIPID PANEL: CPT

## 2022-04-09 PROCEDURE — 84153 ASSAY OF PSA TOTAL: CPT

## 2022-04-09 PROCEDURE — 82570 ASSAY OF URINE CREATININE: CPT

## 2022-04-10 SDOH — ECONOMIC STABILITY: FOOD INSECURITY: WITHIN THE PAST 12 MONTHS, YOU WORRIED THAT YOUR FOOD WOULD RUN OUT BEFORE YOU GOT MONEY TO BUY MORE.: NEVER TRUE

## 2022-04-10 SDOH — HEALTH STABILITY: PHYSICAL HEALTH: ON AVERAGE, HOW MANY DAYS PER WEEK DO YOU ENGAGE IN MODERATE TO STRENUOUS EXERCISE (LIKE A BRISK WALK)?: 7 DAYS

## 2022-04-10 SDOH — ECONOMIC STABILITY: HOUSING INSECURITY
IN THE LAST 12 MONTHS, WAS THERE A TIME WHEN YOU DID NOT HAVE A STEADY PLACE TO SLEEP OR SLEPT IN A SHELTER (INCLUDING NOW)?: NO

## 2022-04-10 SDOH — ECONOMIC STABILITY: TRANSPORTATION INSECURITY
IN THE PAST 12 MONTHS, HAS LACK OF RELIABLE TRANSPORTATION KEPT YOU FROM MEDICAL APPOINTMENTS, MEETINGS, WORK OR FROM GETTING THINGS NEEDED FOR DAILY LIVING?: NO

## 2022-04-10 SDOH — ECONOMIC STABILITY: INCOME INSECURITY: IN THE LAST 12 MONTHS, WAS THERE A TIME WHEN YOU WERE NOT ABLE TO PAY THE MORTGAGE OR RENT ON TIME?: NO

## 2022-04-10 SDOH — HEALTH STABILITY: MENTAL HEALTH
STRESS IS WHEN SOMEONE FEELS TENSE, NERVOUS, ANXIOUS, OR CAN'T SLEEP AT NIGHT BECAUSE THEIR MIND IS TROUBLED. HOW STRESSED ARE YOU?: TO SOME EXTENT

## 2022-04-10 SDOH — ECONOMIC STABILITY: INCOME INSECURITY: HOW HARD IS IT FOR YOU TO PAY FOR THE VERY BASICS LIKE FOOD, HOUSING, MEDICAL CARE, AND HEATING?: NOT VERY HARD

## 2022-04-10 SDOH — ECONOMIC STABILITY: TRANSPORTATION INSECURITY
IN THE PAST 12 MONTHS, HAS LACK OF TRANSPORTATION KEPT YOU FROM MEETINGS, WORK, OR FROM GETTING THINGS NEEDED FOR DAILY LIVING?: NO

## 2022-04-10 SDOH — ECONOMIC STABILITY: FOOD INSECURITY: WITHIN THE PAST 12 MONTHS, THE FOOD YOU BOUGHT JUST DIDN'T LAST AND YOU DIDN'T HAVE MONEY TO GET MORE.: NEVER TRUE

## 2022-04-10 SDOH — ECONOMIC STABILITY: HOUSING INSECURITY: IN THE LAST 12 MONTHS, HOW MANY PLACES HAVE YOU LIVED?: 1

## 2022-04-10 SDOH — HEALTH STABILITY: PHYSICAL HEALTH: ON AVERAGE, HOW MANY MINUTES DO YOU ENGAGE IN EXERCISE AT THIS LEVEL?: 10 MIN

## 2022-04-10 SDOH — ECONOMIC STABILITY: TRANSPORTATION INSECURITY
IN THE PAST 12 MONTHS, HAS THE LACK OF TRANSPORTATION KEPT YOU FROM MEDICAL APPOINTMENTS OR FROM GETTING MEDICATIONS?: NO

## 2022-04-10 ASSESSMENT — SOCIAL DETERMINANTS OF HEALTH (SDOH)
HOW HARD IS IT FOR YOU TO PAY FOR THE VERY BASICS LIKE FOOD, HOUSING, MEDICAL CARE, AND HEATING?: NOT VERY HARD
HOW OFTEN DO YOU ATTEND CHURCH OR RELIGIOUS SERVICES?: NEVER
HOW OFTEN DO YOU ATTENT MEETINGS OF THE CLUB OR ORGANIZATION YOU BELONG TO?: NEVER
DO YOU BELONG TO ANY CLUBS OR ORGANIZATIONS SUCH AS CHURCH GROUPS UNIONS, FRATERNAL OR ATHLETIC GROUPS, OR SCHOOL GROUPS?: NO
IN A TYPICAL WEEK, HOW MANY TIMES DO YOU TALK ON THE PHONE WITH FAMILY, FRIENDS, OR NEIGHBORS?: ONCE A WEEK
HOW OFTEN DO YOU HAVE A DRINK CONTAINING ALCOHOL: 2-4 TIMES A MONTH
HOW OFTEN DO YOU HAVE SIX OR MORE DRINKS ON ONE OCCASION: LESS THAN MONTHLY
HOW OFTEN DO YOU GET TOGETHER WITH FRIENDS OR RELATIVES?: ONCE A WEEK
HOW OFTEN DO YOU GET TOGETHER WITH FRIENDS OR RELATIVES?: ONCE A WEEK
WITHIN THE PAST 12 MONTHS, YOU WORRIED THAT YOUR FOOD WOULD RUN OUT BEFORE YOU GOT THE MONEY TO BUY MORE: NEVER TRUE
DO YOU BELONG TO ANY CLUBS OR ORGANIZATIONS SUCH AS CHURCH GROUPS UNIONS, FRATERNAL OR ATHLETIC GROUPS, OR SCHOOL GROUPS?: NO
HOW MANY DRINKS CONTAINING ALCOHOL DO YOU HAVE ON A TYPICAL DAY WHEN YOU ARE DRINKING: 1 OR 2
IN A TYPICAL WEEK, HOW MANY TIMES DO YOU TALK ON THE PHONE WITH FAMILY, FRIENDS, OR NEIGHBORS?: ONCE A WEEK
HOW OFTEN DO YOU ATTEND CHURCH OR RELIGIOUS SERVICES?: NEVER
HOW OFTEN DO YOU ATTENT MEETINGS OF THE CLUB OR ORGANIZATION YOU BELONG TO?: NEVER

## 2022-04-10 ASSESSMENT — LIFESTYLE VARIABLES
HOW OFTEN DO YOU HAVE A DRINK CONTAINING ALCOHOL: 2-4 TIMES A MONTH
HOW OFTEN DO YOU HAVE SIX OR MORE DRINKS ON ONE OCCASION: LESS THAN MONTHLY
HOW MANY STANDARD DRINKS CONTAINING ALCOHOL DO YOU HAVE ON A TYPICAL DAY: 1 OR 2

## 2022-04-12 LAB
PSA FREE MFR SERPL: 27 %
PSA FREE SERPL-MCNC: 0.4 NG/ML
PSA SERPL-MCNC: 1.5 NG/ML (ref 0–4)

## 2022-04-13 ENCOUNTER — HOSPITAL ENCOUNTER (OUTPATIENT)
Facility: MEDICAL CENTER | Age: 57
End: 2022-04-13
Attending: NURSE PRACTITIONER
Payer: COMMERCIAL

## 2022-04-13 ENCOUNTER — OFFICE VISIT (OUTPATIENT)
Dept: MEDICAL GROUP | Facility: MEDICAL CENTER | Age: 57
End: 2022-04-13
Payer: COMMERCIAL

## 2022-04-13 VITALS
OXYGEN SATURATION: 97 % | TEMPERATURE: 97.4 F | HEART RATE: 58 BPM | HEIGHT: 70 IN | DIASTOLIC BLOOD PRESSURE: 86 MMHG | SYSTOLIC BLOOD PRESSURE: 128 MMHG | BODY MASS INDEX: 31.41 KG/M2 | WEIGHT: 219.4 LBS

## 2022-04-13 DIAGNOSIS — R39.11 URINARY HESITANCY: ICD-10-CM

## 2022-04-13 DIAGNOSIS — Z23 NEED FOR VACCINATION: ICD-10-CM

## 2022-04-13 DIAGNOSIS — R35.0 URINARY FREQUENCY: ICD-10-CM

## 2022-04-13 DIAGNOSIS — R10.2 PELVIC PAIN: ICD-10-CM

## 2022-04-13 DIAGNOSIS — Z00.00 ANNUAL PHYSICAL EXAM: ICD-10-CM

## 2022-04-13 DIAGNOSIS — Z79.899 CHRONIC USE OF BENZODIAZEPINE FOR THERAPEUTIC PURPOSE: ICD-10-CM

## 2022-04-13 DIAGNOSIS — E78.2 MIXED HYPERLIPIDEMIA: ICD-10-CM

## 2022-04-13 DIAGNOSIS — I10 PRIMARY HYPERTENSION: ICD-10-CM

## 2022-04-13 DIAGNOSIS — R73.03 PREDIABETES: ICD-10-CM

## 2022-04-13 DIAGNOSIS — F41.9 ANXIETY: ICD-10-CM

## 2022-04-13 DIAGNOSIS — M79.671 RIGHT FOOT PAIN: ICD-10-CM

## 2022-04-13 DIAGNOSIS — M10.071 ACUTE IDIOPATHIC GOUT INVOLVING TOE OF RIGHT FOOT: ICD-10-CM

## 2022-04-13 PROBLEM — M79.672 LEFT FOOT PAIN: Status: ACTIVE | Noted: 2022-04-13

## 2022-04-13 LAB
APPEARANCE UR: CLEAR
BILIRUB UR STRIP-MCNC: NEGATIVE MG/DL
COLOR UR AUTO: YELLOW
GLUCOSE UR STRIP.AUTO-MCNC: NEGATIVE MG/DL
KETONES UR STRIP.AUTO-MCNC: NEGATIVE MG/DL
LEUKOCYTE ESTERASE UR QL STRIP.AUTO: NEGATIVE
NITRITE UR QL STRIP.AUTO: NEGATIVE
PH UR STRIP.AUTO: 5 [PH] (ref 5–8)
PROT UR QL STRIP: NEGATIVE MG/DL
RBC UR QL AUTO: NORMAL
SP GR UR STRIP.AUTO: 1.02
UROBILINOGEN UR STRIP-MCNC: 0.2 MG/DL

## 2022-04-13 PROCEDURE — 87086 URINE CULTURE/COLONY COUNT: CPT

## 2022-04-13 PROCEDURE — 90715 TDAP VACCINE 7 YRS/> IM: CPT | Performed by: NURSE PRACTITIONER

## 2022-04-13 PROCEDURE — 99396 PREV VISIT EST AGE 40-64: CPT | Mod: 25 | Performed by: NURSE PRACTITIONER

## 2022-04-13 PROCEDURE — 81002 URINALYSIS NONAUTO W/O SCOPE: CPT | Performed by: NURSE PRACTITIONER

## 2022-04-13 PROCEDURE — 90471 IMMUNIZATION ADMIN: CPT | Performed by: NURSE PRACTITIONER

## 2022-04-13 RX ORDER — INDOMETHACIN 50 MG/1
50 CAPSULE ORAL 3 TIMES DAILY
Qty: 30 CAPSULE | Refills: 1 | Status: SHIPPED | OUTPATIENT
Start: 2022-04-13 | End: 2023-02-24 | Stop reason: SDUPTHER

## 2022-04-13 ASSESSMENT — FIBROSIS 4 INDEX: FIB4 SCORE: 0.31

## 2022-04-13 NOTE — ASSESSMENT & PLAN NOTE
Chornic, ongoing for about 3 years. Has worsened in the past year. Has significant difficulty fully emptying bladder, urine stream is variable during urination, starting and stopping. Does report some sensations of bladder pressure and pain on occasion.   
Chronic. A1C slightly worse this year. 6.4. Has been eating more poorly, working on diet changes  
Chronic. Has been on and off more consistently for the past 2 months, Prior to that it had been a few months since a flare.   
Chronic. Previously on lexapro but is off this now. Feels his day to day anxiety is well managed with lifestyle, however he does have occasional issues with spikes in anxiety causing insomnia for which he uses alprazolam as needed.  Using around twice monthly, will take half tablet at night.     Alcohol use is not often.   
Chronic. Stable on lisinopril 20 mg daily and atenolol 50 mg daily. Checks BP at home occasionally if not feeling well.     Denies chest pain, headache, dizziness.     Established with ophthalmology  
Chronic. Stable on lovastatin 40 mg daily.   
Ongoing for 2 weeks. Took his indomethacin 50 mg TID for one day. Symptoms were in improved the following day, but then worsened a few days later. He took two more doses of indomethacin then stopped because he wasn't sure if he should continue. Last taken last week.     Pain in located in the right great toe and now in right ankle. Has been limping on it. Did take ibuprofen 400 mg this morning which didn't help.   
DISCHARGE

## 2022-04-13 NOTE — PATIENT INSTRUCTIONS
Low-Purine Eating Plan  A low-purine eating plan involves making food choices to limit your intake of purine. Purine is a kind of uric acid. Too much uric acid in your blood can cause certain conditions, such as gout and kidney stones. Eating a low-purine diet can help control these conditions.  What are tips for following this plan?  Reading food labels    · Avoid foods with saturated or Trans fat.  · Check the ingredient list of grains-based foods, such as bread and cereal, to make sure that they contain whole grains.  · Check the ingredient list of sauces or soups to make sure they do not contain meat or fish.  · When choosing soft drinks, check the ingredient list to make sure they do not contain high-fructose corn syrup.  Shopping  · Buy plenty of fresh fruits and vegetables.  · Avoid buying canned or fresh fish.  · Buy dairy products labeled as low-fat or nonfat.  · Avoid buying premade or processed foods. These foods are often high in fat, salt (sodium), and added sugar.  Cooking  · Use olive oil instead of butter when cooking. Oils like olive oil, canola oil, and sunflower oil contain healthy fats.  Meal planning  · Learn which foods do or do not affect you. If you find out that a food tends to cause your gout symptoms to flare up, avoid eating that food. You can enjoy foods that do not cause problems. If you have any questions about a food item, talk with your dietitian or health care provider.  · Limit foods high in fat, especially saturated fat. Fat makes it harder for your body to get rid of uric acid.  · Choose foods that are lower in fat and are lean sources of protein.  General guidelines  · Limit alcohol intake to no more than 1 drink a day for nonpregnant women and 2 drinks a day for men. One drink equals 12 oz of beer, 5 oz of wine, or 1½ oz of hard liquor. Alcohol can affect the way your body gets rid of uric acid.  · Drink plenty of water to keep your urine clear or pale yellow. Fluids can help  remove uric acid from your body.  · If directed by your health care provider, take a vitamin C supplement.  · Work with your health care provider and dietitian to develop a plan to achieve or maintain a healthy weight. Losing weight can help reduce uric acid in your blood.  What foods are recommended?  The items listed may not be a complete list. Talk with your dietitian about what dietary choices are best for you.  Foods low in purines  Foods low in purines do not need to be limited. These include:  · All fruits.  · All low-purine vegetables, pickles, and olives.  · Breads, pasta, rice, cornbread, and popcorn. Cake and other baked goods.  · All dairy foods.  · Eggs, nuts, and nut butters.  · Spices and condiments, such as salt, herbs, and vinegar.  · Plant oils, butter, and margarine.  · Water, sugar-free soft drinks, tea, coffee, and cocoa.  · Vegetable-based soups, broths, sauces, and gravies.  Foods moderate in purines  Foods moderate in purines should be limited to the amounts listed.  · ½ cup of asparagus, cauliflower, spinach, mushrooms, or green peas, each day.  · 2/3 cup uncooked oatmeal, each day.  · ¼ cup dry wheat bran or wheat germ, each day.  · 2-3 ounces of meat or poultry, each day.  · 4-6 ounces of shellfish, such as crab, lobster, oysters, or shrimp, each day.  · 1 cup cooked beans, peas, or lentils, each day.  · Soup, broths, or bouillon made from meat or fish. Limit these foods as much as possible.  What foods are not recommended?  The items listed may not be a complete list. Talk with your dietitian about what dietary choices are best for you.  Limit your intake of foods high in purines, including:  · Beer and other alcohol.  · Meat-based gravy or sauce.  · Canned or fresh fish, such as:  ? Anchovies, sardines, herring, and tuna.  ? Mussels and scallops.  ? Codfish, trout, and marti.  · Michele.  · Organ meats, such as:  ? Liver or kidney.  ? Tripe.  ? Sweetbreads (thymus gland or  pancreas).  · Wild game or goose.  · Yeast or yeast extract supplements.  · Drinks sweetened with high-fructose corn syrup.  Summary  · Eating a low-purine diet can help control conditions caused by too much uric acid in the body, such as gout or kidney stones.  · Choose low-purine foods, limit alcohol, and limit foods high in fat.  · You will learn over time which foods do or do not affect you. If you find out that a food tends to cause your gout symptoms to flare up, avoid eating that food.  This information is not intended to replace advice given to you by your health care provider. Make sure you discuss any questions you have with your health care provider.  Document Released: 04/13/2012 Document Revised: 11/30/2018 Document Reviewed: 01/31/2018  [employer] Patient Education © 2020 [employer]      Gout    Gout is a condition that causes painful swelling of the joints. Gout is a type of inflammation of the joints (arthritis). This condition is caused by having too much uric acid in the body. Uric acid is a chemical that forms when the body breaks down substances called purines. Purines are important for building body proteins.  When the body has too much uric acid, sharp crystals can form and build up inside the joints. This causes pain and swelling. Gout attacks can happen quickly and may be very painful (acute gout). Over time, the attacks can affect more joints and become more frequent (chronic gout). Gout can also cause uric acid to build up under the skin and inside the kidneys.  What are the causes?  This condition is caused by too much uric acid in your blood. This can happen because:  · Your kidneys do not remove enough uric acid from your blood. This is the most common cause.  · Your body makes too much uric acid. This can happen with some cancers and cancer treatments. It can also occur if your body is breaking down too many red blood cells (hemolytic anemia).  · You eat too many foods that are high in  purines. These foods include organ meats and some seafood. Alcohol, especially beer, is also high in purines.  A gout attack may be triggered by trauma or stress.  What increases the risk?  You are more likely to develop this condition if you:  · Have a family history of gout.  · Are male and middle-aged.  · Are female and have gone through menopause.  · Are obese.  · Frequently drink alcohol, especially beer.  · Are dehydrated.  · Lose weight too quickly.  · Have an organ transplant.  · Have lead poisoning.  · Take certain medicines, including aspirin, cyclosporine, diuretics, levodopa, and niacin.  · Have kidney disease.  · Have a skin condition called psoriasis.  What are the signs or symptoms?  An attack of acute gout happens quickly. It usually occurs in just one joint. The most common place is the big toe. Attacks often start at night. Other joints that may be affected include joints of the feet, ankle, knee, fingers, wrist, or elbow. Symptoms of this condition may include:  · Severe pain.  · Warmth.  · Swelling.  · Stiffness.  · Tenderness. The affected joint may be very painful to touch.  · Shiny, red, or purple skin.  · Chills and fever.  Chronic gout may cause symptoms more frequently. More joints may be involved. You may also have white or yellow lumps (tophi) on your hands or feet or in other areas near your joints.  How is this diagnosed?  This condition is diagnosed based on your symptoms, medical history, and physical exam. You may have tests, such as:  · Blood tests to measure uric acid levels.  · Removal of joint fluid with a thin needle (aspiration) to look for uric acid crystals.  · X-rays to look for joint damage.  How is this treated?  Treatment for this condition has two phases: treating an acute attack and preventing future attacks. Acute gout treatment may include medicines to reduce pain and swelling, including:  · NSAIDs.  · Steroids. These are strong anti-inflammatory medicines that can be  taken by mouth (orally) or injected into a joint.  · Colchicine. This medicine relieves pain and swelling when it is taken soon after an attack. It can be given by mouth or through an IV.  Preventive treatment may include:  · Daily use of smaller doses of NSAIDs or colchicine.  · Use of a medicine that reduces uric acid levels in your blood.  · Changes to your diet. You may need to see a dietitian about what to eat and drink to prevent gout.  Follow these instructions at home:  During a gout attack    · If directed, put ice on the affected area:  ? Put ice in a plastic bag.  ? Place a towel between your skin and the bag.  ? Leave the ice on for 20 minutes, 2-3 times a day.  · Raise (elevate) the affected joint above the level of your heart as often as possible.  · Rest the joint as much as possible. If the affected joint is in your leg, you may be given crutches to use.  · Follow instructions from your health care provider about eating or drinking restrictions.  Avoiding future gout attacks  · Follow a low-purine diet as told by your dietitian or health care provider. Avoid foods and drinks that are high in purines, including liver, kidney, anchovies, asparagus, herring, mushrooms, mussels, and beer.  · Maintain a healthy weight or lose weight if you are overweight. If you want to lose weight, talk with your health care provider. It is important that you do not lose weight too quickly.  · Start or maintain an exercise program as told by your health care provider.  Eating and drinking  · Drink enough fluids to keep your urine pale yellow.  · If you drink alcohol:  ? Limit how much you use to:  § 0-1 drink a day for women.  § 0-2 drinks a day for men.  ? Be aware of how much alcohol is in your drink. In the [place] one drink equals one 12 oz bottle of beer (355 mL) one 5 oz glass of wine (148 mL), or one 1½ oz glass of hard liquor (44 mL).  General instructions  · Take over-the-counter and prescription medicines only as  told by your health care provider.  · Do not drive or use heavy machinery while taking prescription pain medicine.  · Return to your normal activities as told by your health care provider. Ask your health care provider what activities are safe for you.  · Keep all follow-up visits as told by your health care provider. This is important.  Contact a health care provider if you have:  · Another gout attack.  · Continuing symptoms of a gout attack after 10 days of treatment.  · Side effects from your medicines.  · Chills or a fever.  · Burning pain when you urinate.  · Pain in your lower back or belly.  Get help right away if you:  · Have severe or uncontrolled pain.  · Cannot urinate.  Summary  · Gout is painful swelling of the joints caused by inflammation.  · The most common site of pain is the big toe, but it can affect other joints in the body.  · Medicines and dietary changes can help to prevent and treat gout attacks.  This information is not intended to replace advice given to you by your health care provider. Make sure you discuss any questions you have with your health care provider.  Document Released: 12/15/2001 Document Revised: 07/10/2019 Document Reviewed: 07/10/2019  [employer] Patient Education © 2020 [employer]

## 2022-04-16 LAB
BACTERIA UR CULT: NORMAL
SIGNIFICANT IND 70042: NORMAL
SITE SITE: NORMAL
SOURCE SOURCE: NORMAL

## 2022-04-29 NOTE — PROGRESS NOTES
Subjective:   Danny Guerrero is a 56 y.o. male here today for annual, lab review, foot pain    Right foot pain  Ongoing for 2 weeks. Took his indomethacin 50 mg TID for one day. Symptoms were in improved the following day, but then worsened a few days later. He took two more doses of indomethacin then stopped because he wasn't sure if he should continue. Last taken last week.     Pain in located in the right great toe and now in right ankle. Has been limping on it. Did take ibuprofen 400 mg this morning which didn't help.     Hyperlipidemia  Chronic. Stable on lovastatin 40 mg daily.     Urinary hesitancy  Chornic, ongoing for about 3 years. Has worsened in the past year. Has significant difficulty fully emptying bladder, urine stream is variable during urination, starting and stopping. Does report some sensations of bladder pressure and pain on occasion.     Acute idiopathic gout involving toe of right foot  Chronic. Has been on and off more consistently for the past 2 months, Prior to that it had been a few months since a flare.     HTN (hypertension)  Chronic. Stable on lisinopril 20 mg daily and atenolol 50 mg daily. Checks BP at home occasionally if not feeling well.     Denies chest pain, headache, dizziness.     Established with ophthalmology    Prediabetes  Chronic. A1C slightly worse this year. 6.4. Has been eating more poorly, working on diet changes    Anxiety  Chronic. Previously on lexapro but is off this now. Feels his day to day anxiety is well managed with lifestyle, however he does have occasional issues with spikes in anxiety causing insomnia for which he uses alprazolam as needed.  Using around twice monthly, will take half tablet at night.     Alcohol use is not often.        Current medicines (including changes today)  Current Outpatient Medications   Medication Sig Dispense Refill   • indomethacin (INDOCIN) 50 MG Cap Take 1 Capsule by mouth 3 times a day. For about 5-7 days. Discontinue 2-3 days  "after resolution of symptoms. 30 Capsule 1   • atenolol (TENORMIN) 50 MG Tab Take 1 Tablet by mouth every day. 90 Tablet 0   • omeprazole (PRILOSEC) 20 MG delayed-release capsule Take 1 Capsule by mouth every day. 90 Capsule 0   • lisinopril (PRINIVIL) 20 MG Tab Take 1 Tablet by mouth every day. 90 Tablet 0   • lovastatin (MEVACOR) 40 MG tablet Take 1 Tablet by mouth every evening. 90 Tablet 0   • latanoprost (XALATAN) 0.005 % Solution PLACE 1 DROP INTO BOTH EYES AT BEDTIME  4   • docosahexanoic acid (FISH OIL) 1000 MG CAPS Take 2,000 mg by mouth every day.       No current facility-administered medications for this visit.     He  has a past medical history of Allergic rhinitis, Anxiety, Depression, GERD (gastroesophageal reflux disease), Glaucoma, HTN, Hyperlipidemia, Impaired fasting glucose, Kidney stones, Primary osteoarthritis of hip, and Vitamin D deficiency.    He has no past medical history of Encounter for long-term (current) use of other medications.    ROS   No chest pain, no shortness of breath, no abdominal pain  Positive ROS as per HPI.  All other systems reviewed and are negative.     Objective:     /86 (BP Location: Right arm, Patient Position: Sitting)   Pulse (!) 58   Temp 36.3 °C (97.4 °F) (Temporal)   Ht 1.778 m (5' 10\")   Wt 99.5 kg (219 lb 6.4 oz)   SpO2 97%  Body mass index is 31.48 kg/m².     Physical Exam:  Constitutional: Alert, no distress.  Skin: Warm, dry, good turgor, no rashes in visible areas.  Eye: Equal, round and reactive, conjunctiva clear, lids normal.  ENMT: Lips without lesions, good dentition, oropharynx clear.  Neck: Trachea midline, no masses, no thyromegaly. No cervical or supraclavicular lymphadenopathy  Respiratory: Unlabored respiratory effort, lungs clear to auscultation, no wheezes, no ronchi.  Cardiovascular: Normal S1, S2, no murmur, no edema.  Abdomen: Soft, non-tender, no masses, no hepatosplenomegaly.  Psych: Alert and oriented x3, normal affect and " mood.        Assessment and Plan:   The following treatment plan was discussed    1. Annual physical exam  Health maintenance and labs reviewed  Patient and I discussed the importance of lifestyle changes, with particular emphasis on decreasing sugar and carbohydrate intake and increasing plant-based nutrition (for the purposes of weight loss, general health, and prevention of chronic illnesses), as well as regular cardiovascular exercise, proper sleep, and stress management. Patient verbalized understanding.    2. Right foot pain  Unstable, gout    3. Acute idiopathic gout involving toe of right foot  Unstable  Not taking preventative therapy  Restart indocin TID until symptoms have completely resolved  Consider starting allopurinol once symptoms have completley resolved  - indomethacin (INDOCIN) 50 MG Cap; Take 1 Capsule by mouth 3 times a day. For about 5-7 days. Discontinue 2-3 days after resolution of symptoms.  Dispense: 30 Capsule; Refill: 1    4. Mixed hyperlipidemia  Stable  Continue lovastatin 40 mg daily    5. Urinary hesitancy  Unstable  UA with trace blood, sent for culture  Follow up with urology for further eval and treatment  PSA stable at 1.5  - POCT Urinalysis  - Referral to Urology  - URINE CULTURE(NEW); Future    6. Primary hypertension  Stable  Continue atenolol 50 mg daily and lisinopril 20 mg daily    7. Prediabetes  Unstable  Worse at 6.4  Diet, exercise, weight loss advised    8. Anxiety  Stable  Continue sparing use of alprazolam for panic attack and insomnia  UDS and contract updated today   reviewed and consistent  - PAIN MANAGEMENT SCRN, UR; Future  - Controlled Substance Treatment Agreement    9. Chronic use of benzodiazepine for therapeutic purpose  - PAIN MANAGEMENT SCRN, UR; Future  - Controlled Substance Treatment Agreement    10. Urinary frequency  Unstable  UA positive for trace blood, check culture  Follow up with urology  - POCT Urinalysis  - Referral to Urology  - URINE  CULTURE(NEW); Future    11. Pelvic pain  Unstable  Possibly UTI, check culture  Consider CT urogram  Follow up with urology  - Referral to Urology  - URINE CULTURE(NEW); Future    12. Need for vaccination  - Tdap =>8yo IM       Followup: Return in about 3 months (around 7/13/2022).    I have placed the below orders and discussed them with an approved delegating provider. The MA is performing the below orders under the direction of Dr. Damon

## 2022-06-11 DIAGNOSIS — K21.9 GASTROESOPHAGEAL REFLUX DISEASE WITHOUT ESOPHAGITIS: ICD-10-CM

## 2022-06-15 RX ORDER — OMEPRAZOLE 20 MG/1
20 CAPSULE, DELAYED RELEASE ORAL
Qty: 90 CAPSULE | Refills: 3 | Status: SHIPPED | OUTPATIENT
Start: 2022-06-15 | End: 2023-06-07 | Stop reason: SDUPTHER

## 2022-07-13 ENCOUNTER — TELEPHONE (OUTPATIENT)
Dept: MEDICAL GROUP | Facility: MEDICAL CENTER | Age: 57
End: 2022-07-13
Payer: COMMERCIAL

## 2022-07-20 ENCOUNTER — APPOINTMENT (OUTPATIENT)
Dept: MEDICAL GROUP | Facility: MEDICAL CENTER | Age: 57
End: 2022-07-20
Payer: COMMERCIAL

## 2022-08-24 ENCOUNTER — OFFICE VISIT (OUTPATIENT)
Dept: MEDICAL GROUP | Facility: MEDICAL CENTER | Age: 57
End: 2022-08-24
Payer: COMMERCIAL

## 2022-08-24 VITALS
HEART RATE: 61 BPM | SYSTOLIC BLOOD PRESSURE: 120 MMHG | BODY MASS INDEX: 3.27 KG/M2 | WEIGHT: 22.06 LBS | DIASTOLIC BLOOD PRESSURE: 80 MMHG | HEIGHT: 69 IN | TEMPERATURE: 97.5 F

## 2022-08-24 DIAGNOSIS — R31.29 MICROSCOPIC HEMATURIA: ICD-10-CM

## 2022-08-24 DIAGNOSIS — R73.03 PREDIABETES: ICD-10-CM

## 2022-08-24 DIAGNOSIS — R39.11 URINARY HESITANCY: ICD-10-CM

## 2022-08-24 DIAGNOSIS — Z87.442 HISTORY OF KIDNEY STONES: ICD-10-CM

## 2022-08-24 LAB
APPEARANCE UR: CLEAR
BILIRUB UR STRIP-MCNC: NORMAL MG/DL
COLOR UR AUTO: YELLOW
GLUCOSE UR STRIP.AUTO-MCNC: NORMAL MG/DL
HBA1C MFR BLD: 6.2 % (ref 0–5.6)
INT CON NEG: ABNORMAL
INT CON POS: ABNORMAL
KETONES UR STRIP.AUTO-MCNC: NORMAL MG/DL
LEUKOCYTE ESTERASE UR QL STRIP.AUTO: NORMAL
NITRITE UR QL STRIP.AUTO: NORMAL
PH UR STRIP.AUTO: 5 [PH] (ref 5–8)
PROT UR QL STRIP: NORMAL MG/DL
RBC UR QL AUTO: NORMAL
SP GR UR STRIP.AUTO: 1.03
UROBILINOGEN UR STRIP-MCNC: 0.2 MG/DL

## 2022-08-24 PROCEDURE — 81002 URINALYSIS NONAUTO W/O SCOPE: CPT | Performed by: NURSE PRACTITIONER

## 2022-08-24 PROCEDURE — 99214 OFFICE O/P EST MOD 30 MIN: CPT | Performed by: NURSE PRACTITIONER

## 2022-08-24 PROCEDURE — 83036 HEMOGLOBIN GLYCOSYLATED A1C: CPT | Performed by: NURSE PRACTITIONER

## 2022-08-24 ASSESSMENT — FIBROSIS 4 INDEX: FIB4 SCORE: 0.31

## 2022-08-24 ASSESSMENT — PATIENT HEALTH QUESTIONNAIRE - PHQ9: CLINICAL INTERPRETATION OF PHQ2 SCORE: 0

## 2022-08-24 NOTE — PATIENT INSTRUCTIONS
[facility]  [address] [facility]  [place]  [address]  Phone:  [phone]    UROLOGY [address]  [place] [address]  Phone: [phone]

## 2022-08-24 NOTE — ASSESSMENT & PLAN NOTE
Chronic. A1C slightly worse this year. 6.4. Had been eating more poorly, working on diet changes and limiting sugar intake.

## 2022-08-24 NOTE — ASSESSMENT & PLAN NOTE
Chronic, ongoing for about 3 years. Has worsened in the past year. Has significant difficulty fully emptying bladder, urine stream is variable during urination, starting and stopping. Does report some sensations of bladder pressure and pain on occasion.     Patient reports that since last visit pelvic pain has completely resolved. Other symptoms have remained. He did not follow up with Urology because symptoms did improve.

## 2022-09-07 NOTE — PROGRESS NOTES
Subjective:   Danny Guerrero is a 56 y.o. male here today for lab review, urinary complaints    Prediabetes  Chronic. A1C slightly worse this year. 6.4. Had been eating more poorly, working on diet changes and limiting sugar intake.     Microscopic hematuria  Chronic, ongoing for about 3 years. Has worsened in the past year. Has significant difficulty fully emptying bladder, urine stream is variable during urination, starting and stopping. Does report some sensations of bladder pressure and pain on occasion.     Patient reports that since last visit pelvic pain has completely resolved. Other symptoms have remained. He did not follow up with Urology because symptoms did improve.      Current medicines (including changes today)  Current Outpatient Medications   Medication Sig Dispense Refill    lisinopril (PRINIVIL) 20 MG Tab TAKE 1 TABLET BY MOUTH EVERY DAY 90 Tablet 3    lovastatin (MEVACOR) 40 MG tablet TAKE 1 TABLET BY MOUTH EVERY DAY IN THE EVENING 90 Tablet 3    atenolol (TENORMIN) 50 MG Tab TAKE 1 TABLET BY MOUTH EVERY DAY 90 Tablet 3    omeprazole (PRILOSEC) 20 MG delayed-release capsule TAKE 1 CAPSULE BY MOUTH EVERY DAY 90 Capsule 3    indomethacin (INDOCIN) 50 MG Cap Take 1 Capsule by mouth 3 times a day. For about 5-7 days. Discontinue 2-3 days after resolution of symptoms. 30 Capsule 1    docosahexanoic acid (OMEGA 3 FA) 1000 MG Cap Take 2,000 mg by mouth every day.      latanoprost (XALATAN) 0.005 % Solution PLACE 1 DROP INTO BOTH EYES AT BEDTIME  4     No current facility-administered medications for this visit.     He  has a past medical history of Allergic rhinitis, Anxiety, Depression, GERD (gastroesophageal reflux disease), Glaucoma, HTN, Hyperlipidemia, Impaired fasting glucose, Kidney stones, Primary osteoarthritis of hip, and Vitamin D deficiency.    He has no past medical history of Encounter for long-term (current) use of other medications.    ROS   No chest pain, no shortness of breath, no  "abdominal pain  Positive ROS as per HPI.  All other systems reviewed and are negative.     Objective:     /80 (BP Location: Right arm, Patient Position: Sitting, BP Cuff Size: Adult)   Pulse 61   Temp 36.4 °C (97.5 °F) (Temporal)   Ht 1.753 m (5' 9\")   Wt (!) 10 kg (22 lb 1 oz)  Body mass index is 3.26 kg/m².     Physical Exam:  Constitutional: Alert, no distress.  Skin: Warm, dry, good turgor, no rashes in visible areas.  Eye: Equal, round and reactive, conjunctiva clear, lids normal.  ENMT: Mask in place  Respiratory: Unlabored respiratory effort  Abdomen: Soft, non-tender, no masses, no hepatosplenomegaly. No CVA tenderness  Psych: Alert and oriented x3, normal affect and mood.        Assessment and Plan:   The following treatment plan was discussed    1. Microscopic hematuria  Unstable  Repeat UA  Possibly a nonobstructive, asymptomatic kidney stone however lower urinary tract symptoms with normal PSA warrant further evaluation of urinary tract  CT urogram ordered  - POCT Urinalysis  - CT-ABDOMEN & PELVIS W/O    2. Urinary hesitancy  Unstable  Repeat UA positive for trace blood, prior culture is negative  No current symptoms of kidney stone which she has had in the past  Urinary hesitancy with normal PSA  Planned to check CT urogram to evaluate for urinary tract pathology, however this is quite costly for patient. Will do CT without contrast for now per patient request, if no stones are identified then will consider Urogram at that time.  Follow up with urology [place], phone number given   - CT-ABDOMEN & PELVIS W/O    3. History of kidney stones  - CT-ABDOMEN & PELVIS W/O    4. Prediabetes  Unstable, worsening  Diet, exercise, weight loss advised  - POCT Hemoglobin A1C      Followup: Return for pending results.    I have placed the below orders and discussed them with an approved delegating provider. The MA is performing the below orders under the direction of Dr. Damon             "

## 2023-02-17 ENCOUNTER — TELEPHONE (OUTPATIENT)
Dept: MEDICAL GROUP | Facility: MEDICAL CENTER | Age: 58
End: 2023-02-17
Payer: COMMERCIAL

## 2023-02-24 ENCOUNTER — OFFICE VISIT (OUTPATIENT)
Dept: MEDICAL GROUP | Facility: MEDICAL CENTER | Age: 58
End: 2023-02-24
Payer: COMMERCIAL

## 2023-02-24 VITALS
TEMPERATURE: 97.2 F | BODY MASS INDEX: 34.07 KG/M2 | HEIGHT: 69 IN | HEART RATE: 69 BPM | OXYGEN SATURATION: 96 % | WEIGHT: 230 LBS | DIASTOLIC BLOOD PRESSURE: 94 MMHG | SYSTOLIC BLOOD PRESSURE: 146 MMHG

## 2023-02-24 DIAGNOSIS — R73.03 PREDIABETES: ICD-10-CM

## 2023-02-24 DIAGNOSIS — Z11.59 NEED FOR HEPATITIS C SCREENING TEST: ICD-10-CM

## 2023-02-24 DIAGNOSIS — Z00.00 ANNUAL PHYSICAL EXAM: ICD-10-CM

## 2023-02-24 DIAGNOSIS — Z23 NEED FOR VACCINATION: ICD-10-CM

## 2023-02-24 DIAGNOSIS — K57.90 DIVERTICULOSIS: ICD-10-CM

## 2023-02-24 DIAGNOSIS — F41.9 ANXIETY: ICD-10-CM

## 2023-02-24 DIAGNOSIS — I10 PRIMARY HYPERTENSION: ICD-10-CM

## 2023-02-24 DIAGNOSIS — E78.2 MIXED HYPERLIPIDEMIA: ICD-10-CM

## 2023-02-24 DIAGNOSIS — E55.9 VITAMIN D DEFICIENCY: ICD-10-CM

## 2023-02-24 DIAGNOSIS — Z79.899 CHRONIC USE OF BENZODIAZEPINE FOR THERAPEUTIC PURPOSE: ICD-10-CM

## 2023-02-24 DIAGNOSIS — M10.071 ACUTE IDIOPATHIC GOUT INVOLVING TOE OF RIGHT FOOT: ICD-10-CM

## 2023-02-24 PROBLEM — N52.01 ERECTILE DYSFUNCTION DUE TO ARTERIAL INSUFFICIENCY: Status: ACTIVE | Noted: 2022-11-02

## 2023-02-24 PROBLEM — N52.9 ERECTILE DYSFUNCTION: Status: ACTIVE | Noted: 2022-11-02

## 2023-02-24 PROBLEM — N13.8 BENIGN PROSTATIC HYPERPLASIA WITH URINARY OBSTRUCTION: Status: ACTIVE | Noted: 2022-11-02

## 2023-02-24 PROBLEM — R35.1 NOCTURIA: Status: ACTIVE | Noted: 2022-11-02

## 2023-02-24 PROBLEM — N40.1 BENIGN PROSTATIC HYPERPLASIA WITH URINARY OBSTRUCTION: Status: ACTIVE | Noted: 2022-11-02

## 2023-02-24 PROCEDURE — 90686 IIV4 VACC NO PRSV 0.5 ML IM: CPT | Performed by: NURSE PRACTITIONER

## 2023-02-24 PROCEDURE — 99396 PREV VISIT EST AGE 40-64: CPT | Mod: 25 | Performed by: NURSE PRACTITIONER

## 2023-02-24 PROCEDURE — 90746 HEPB VACCINE 3 DOSE ADULT IM: CPT | Performed by: NURSE PRACTITIONER

## 2023-02-24 PROCEDURE — 90472 IMMUNIZATION ADMIN EACH ADD: CPT | Performed by: NURSE PRACTITIONER

## 2023-02-24 PROCEDURE — 90471 IMMUNIZATION ADMIN: CPT | Performed by: NURSE PRACTITIONER

## 2023-02-24 RX ORDER — ALPRAZOLAM 0.5 MG/1
0.5 TABLET ORAL NIGHTLY PRN
Qty: 30 TABLET | Refills: 0 | Status: SHIPPED | OUTPATIENT
Start: 2023-02-24 | End: 2023-06-07 | Stop reason: SDUPTHER

## 2023-02-24 RX ORDER — INDOMETHACIN 50 MG/1
50 CAPSULE ORAL 3 TIMES DAILY
Qty: 30 CAPSULE | Refills: 1 | Status: SHIPPED | OUTPATIENT
Start: 2023-02-24

## 2023-02-24 ASSESSMENT — FIBROSIS 4 INDEX: FIB4 SCORE: 0.31

## 2023-02-24 NOTE — ASSESSMENT & PLAN NOTE
Chronic. Last A1C 6.4. Had been eating more poorly, working on diet changes and limiting sugar intake.

## 2023-02-24 NOTE — ASSESSMENT & PLAN NOTE
Chronic. Stable on lisinopril 20 mg daily and atenolol 50 mg daily. Checks BP at home occasionally, not recently. BP elevated in office today 146/94.     Denies chest pain, headache, dizziness.     Established with ophthalmology

## 2023-02-24 NOTE — ASSESSMENT & PLAN NOTE
Chronic. Has been on and off more consistently for the past 2 months, Prior to that it had been a few months since a flare.

## 2023-02-27 NOTE — PROGRESS NOTES
Subjective:   Danny Guerrero is a 57 y.o. male here today for annual    HTN (hypertension)  Chronic. Stable on lisinopril 20 mg daily and atenolol 50 mg daily. Checks BP at home occasionally, not recently. BP elevated in office today 146/94.     Denies chest pain, headache, dizziness.     Established with ophthalmology    Hyperlipidemia  Chronic. Stable on lovastatin 40 mg daily. Denies myalgias or abdominal pain.     Vitamin D deficiency  Chronic. Not currently taking supplement. Will restart.     Prediabetes  Chronic. Last A1C 6.4. Had been eating more poorly, working on diet changes and limiting sugar intake.     Acute idiopathic gout involving toe of right foot  Chronic. Has been on and off more consistently for the past 2 months, Prior to that it had been a few months since a flare.     Anxiety  Chronic. Previously on lexapro but is off this now. Feels his day to day anxiety is well managed with lifestyle, however he does have occasional issues with spikes in anxiety causing insomnia for which he uses alprazolam as needed.  Using around twice monthly, will take half tablet at night. Last refill 1 year ago, 30 tablets.     Alcohol use is very rare.        Current medicines (including changes today)  Current Outpatient Medications   Medication Sig Dispense Refill    indomethacin (INDOCIN) 50 MG Cap Take 1 Capsule by mouth 3 times a day. For about 5-7 days. Discontinue 2-3 days after resolution of symptoms. 30 Capsule 1    ALPRAZolam (XANAX) 0.5 MG Tab Take 1 Tablet by mouth at bedtime as needed for Sleep for up to 30 days. 30 Tablet 0    lisinopril (PRINIVIL) 20 MG Tab TAKE 1 TABLET BY MOUTH EVERY DAY 90 Tablet 3    lovastatin (MEVACOR) 40 MG tablet TAKE 1 TABLET BY MOUTH EVERY DAY IN THE EVENING 90 Tablet 3    atenolol (TENORMIN) 50 MG Tab TAKE 1 TABLET BY MOUTH EVERY DAY 90 Tablet 3    omeprazole (PRILOSEC) 20 MG delayed-release capsule TAKE 1 CAPSULE BY MOUTH EVERY DAY 90 Capsule 3    latanoprost (XALATAN) 0.005  "% Solution PLACE 1 DROP INTO BOTH EYES AT BEDTIME  4    docosahexanoic acid (OMEGA 3 FA) 1000 MG Cap Take 2,000 mg by mouth every day.       No current facility-administered medications for this visit.     He  has a past medical history of Allergic rhinitis, Anxiety, Depression, GERD (gastroesophageal reflux disease), Glaucoma, HTN, Hyperlipidemia, Impaired fasting glucose, Kidney stones, Primary osteoarthritis of hip, and Vitamin D deficiency.    He has no past medical history of Encounter for long-term (current) use of other medications.    ROS   No chest pain, no shortness of breath, no abdominal pain  Positive ROS as per HPI.  All other systems reviewed and are negative.     Objective:     BP (!) 146/94 (BP Location: Right arm, Patient Position: Sitting, BP Cuff Size: Adult)   Pulse 69   Temp 36.2 °C (97.2 °F)   Ht 1.753 m (5' 9\")   Wt 104 kg (230 lb)   SpO2 96%  Body mass index is 33.97 kg/m².     Physical Exam:  Constitutional: Alert, no distress.  Skin: Warm, dry, good turgor, no rashes in visible areas.  Eye: Equal, round and reactive, conjunctiva clear, lids normal.  ENMT: Lips without lesions, good dentition, oropharynx clear.  Neck: Trachea midline, no masses, no thyromegaly. No cervical or supraclavicular lymphadenopathy  Respiratory: Unlabored respiratory effort, lungs clear to auscultation, no wheezes, no ronchi.  Cardiovascular: Normal S1, S2, no murmur, no edema.  Abdomen: Soft, non-tender, no masses, no hepatosplenomegaly.  Psych: Alert and oriented x3, normal affect and mood.        Assessment and Plan:   The following treatment plan was discussed    1. Annual physical exam  Annual labs ordered, health maintenance reviewed and updated.   Patient and I discussed the importance of lifestyle changes, with particular emphasis on decreasing sugar and carbohydrate intake and increasing plant-based nutrition (for the purposes of weight loss, general health, and prevention of chronic illnesses), as " well as regular cardiovascular exercise, proper sleep, and stress management. Patient verbalized understanding.    - CBC WITH DIFFERENTIAL; Future  - Comp Metabolic Panel; Future  - HEMOGLOBIN A1C; Future  - MICROALBUMIN CREAT RATIO URINE; Future  - Lipid Profile; Future  - TSH WITH REFLEX TO FT4; Future  - PROSTATE SPECIFIC AG SCREENING; Future    2. Vitamin D deficiency  Stable on supplement, will restart    3. Prediabetes  Stable with diet control, due for repeat A1C  - HEMOGLOBIN A1C; Future    4. Mixed hyperlipidemia  Stable on current medication , due for labs  - Lipid Profile; Future    5. Primary hypertension  Unstable  Elevated in office today, will check BP at home and send readings via [employer].   Continue current medications  Due for labs  - MICROALBUMIN CREAT RATIO URINE; Future    6. Acute idiopathic gout involving toe of right foot  Stable  Indomethacin refilled for as needed use  - indomethacin (INDOCIN) 50 MG Cap; Take 1 Capsule by mouth 3 times a day. For about 5-7 days. Discontinue 2-3 days after resolution of symptoms.  Dispense: 30 Capsule; Refill: 1    7. Anxiety  Stable with lifestyle  Alprazolam refilled for sparing use  - ALPRAZolam (XANAX) 0.5 MG Tab; Take 1 Tablet by mouth at bedtime as needed for Sleep for up to 30 days.  Dispense: 30 Tablet; Refill: 0    8. Chronic use of benzodiazepine for therapeutic purpose  - Controlled Substance Treatment Agreement  - PAIN MANAGEMENT SCRN, UR; Future    9. Diverticulosis  Asymptomatic, found on colonoscopy, discussed high fiber diet to reduce risk for diverticulitis    10. Need for hepatitis C screening test  - HEP C VIRUS ANTIBODY; Future    11. Need for vaccination  - Hep B Adult 20+  - Influenza Vaccine Quad Injection (PF)      Followup: Return in about 1 year (around 2/24/2024).    The MA is performing the above orders under the direction of Dr. Damon    Please note that this dictation was created using voice recognition software. I have made  every reasonable attempt to correct obvious errors, but I expect that there are errors of grammar and possibly content that I did not discover before finalizing the note.

## 2023-02-27 NOTE — ASSESSMENT & PLAN NOTE
Chronic. Previously on lexapro but is off this now. Feels his day to day anxiety is well managed with lifestyle, however he does have occasional issues with spikes in anxiety causing insomnia for which he uses alprazolam as needed.  Using around twice monthly, will take half tablet at night. Last refill 1 year ago, 30 tablets.     Alcohol use is very rare.

## 2023-05-20 ENCOUNTER — HOSPITAL ENCOUNTER (OUTPATIENT)
Dept: LAB | Facility: MEDICAL CENTER | Age: 58
End: 2023-05-20
Attending: NURSE PRACTITIONER
Payer: COMMERCIAL

## 2023-05-20 DIAGNOSIS — E78.2 MIXED HYPERLIPIDEMIA: ICD-10-CM

## 2023-05-20 DIAGNOSIS — Z11.59 NEED FOR HEPATITIS C SCREENING TEST: ICD-10-CM

## 2023-05-20 DIAGNOSIS — Z00.00 ANNUAL PHYSICAL EXAM: ICD-10-CM

## 2023-05-20 DIAGNOSIS — I10 PRIMARY HYPERTENSION: ICD-10-CM

## 2023-05-20 DIAGNOSIS — R73.03 PREDIABETES: ICD-10-CM

## 2023-05-20 LAB
ALBUMIN SERPL BCP-MCNC: 4 G/DL (ref 3.2–4.9)
ALBUMIN/GLOB SERPL: 1.3 G/DL
ALP SERPL-CCNC: 67 U/L (ref 30–99)
ALT SERPL-CCNC: 19 U/L (ref 2–50)
ANION GAP SERPL CALC-SCNC: 9 MMOL/L (ref 7–16)
AST SERPL-CCNC: 16 U/L (ref 12–45)
BASOPHILS # BLD AUTO: 0.7 % (ref 0–1.8)
BASOPHILS # BLD: 0.05 K/UL (ref 0–0.12)
BILIRUB SERPL-MCNC: 0.5 MG/DL (ref 0.1–1.5)
BUN SERPL-MCNC: 14 MG/DL (ref 8–22)
CALCIUM ALBUM COR SERPL-MCNC: 8.7 MG/DL (ref 8.5–10.5)
CALCIUM SERPL-MCNC: 8.7 MG/DL (ref 8.5–10.5)
CHLORIDE SERPL-SCNC: 104 MMOL/L (ref 96–112)
CHOLEST SERPL-MCNC: 112 MG/DL (ref 100–199)
CO2 SERPL-SCNC: 23 MMOL/L (ref 20–33)
CREAT SERPL-MCNC: 1.1 MG/DL (ref 0.5–1.4)
CREAT UR-MCNC: 127.51 MG/DL
EOSINOPHIL # BLD AUTO: 0.14 K/UL (ref 0–0.51)
EOSINOPHIL NFR BLD: 1.9 % (ref 0–6.9)
ERYTHROCYTE [DISTWIDTH] IN BLOOD BY AUTOMATED COUNT: 42.9 FL (ref 35.9–50)
EST. AVERAGE GLUCOSE BLD GHB EST-MCNC: 131 MG/DL
GFR SERPLBLD CREATININE-BSD FMLA CKD-EPI: 78 ML/MIN/1.73 M 2
GLOBULIN SER CALC-MCNC: 3.1 G/DL (ref 1.9–3.5)
GLUCOSE SERPL-MCNC: 111 MG/DL (ref 65–99)
HBA1C MFR BLD: 6.2 % (ref 4–5.6)
HCT VFR BLD AUTO: 45 % (ref 42–52)
HCV AB SER QL: NORMAL
HDLC SERPL-MCNC: 29 MG/DL
HGB BLD-MCNC: 14.6 G/DL (ref 14–18)
IMM GRANULOCYTES # BLD AUTO: 0.03 K/UL (ref 0–0.11)
IMM GRANULOCYTES NFR BLD AUTO: 0.4 % (ref 0–0.9)
LDLC SERPL CALC-MCNC: 64 MG/DL
LYMPHOCYTES # BLD AUTO: 2.19 K/UL (ref 1–4.8)
LYMPHOCYTES NFR BLD: 29.1 % (ref 22–41)
MCH RBC QN AUTO: 28.6 PG (ref 27–33)
MCHC RBC AUTO-ENTMCNC: 32.4 G/DL (ref 33.7–35.3)
MCV RBC AUTO: 88.2 FL (ref 81.4–97.8)
MICROALBUMIN UR-MCNC: <1.2 MG/DL
MICROALBUMIN/CREAT UR: NORMAL MG/G (ref 0–30)
MONOCYTES # BLD AUTO: 0.71 K/UL (ref 0–0.85)
MONOCYTES NFR BLD AUTO: 9.4 % (ref 0–13.4)
NEUTROPHILS # BLD AUTO: 4.41 K/UL (ref 1.82–7.42)
NEUTROPHILS NFR BLD: 58.5 % (ref 44–72)
NRBC # BLD AUTO: 0 K/UL
NRBC BLD-RTO: 0 /100 WBC
PLATELET # BLD AUTO: 310 K/UL (ref 164–446)
PMV BLD AUTO: 10.5 FL (ref 9–12.9)
POTASSIUM SERPL-SCNC: 4 MMOL/L (ref 3.6–5.5)
PROT SERPL-MCNC: 7.1 G/DL (ref 6–8.2)
PSA SERPL-MCNC: 1.24 NG/ML (ref 0–4)
RBC # BLD AUTO: 5.1 M/UL (ref 4.7–6.1)
SODIUM SERPL-SCNC: 136 MMOL/L (ref 135–145)
TRIGL SERPL-MCNC: 93 MG/DL (ref 0–149)
TSH SERPL DL<=0.005 MIU/L-ACNC: 2.94 UIU/ML (ref 0.38–5.33)
WBC # BLD AUTO: 7.5 K/UL (ref 4.8–10.8)

## 2023-05-20 PROCEDURE — 80053 COMPREHEN METABOLIC PANEL: CPT

## 2023-05-20 PROCEDURE — 82043 UR ALBUMIN QUANTITATIVE: CPT

## 2023-05-20 PROCEDURE — 36415 COLL VENOUS BLD VENIPUNCTURE: CPT

## 2023-05-20 PROCEDURE — 80061 LIPID PANEL: CPT

## 2023-05-20 PROCEDURE — 86803 HEPATITIS C AB TEST: CPT

## 2023-05-20 PROCEDURE — 84443 ASSAY THYROID STIM HORMONE: CPT

## 2023-05-20 PROCEDURE — 83036 HEMOGLOBIN GLYCOSYLATED A1C: CPT

## 2023-05-20 PROCEDURE — 82570 ASSAY OF URINE CREATININE: CPT

## 2023-05-20 PROCEDURE — 85025 COMPLETE CBC W/AUTO DIFF WBC: CPT

## 2023-05-20 PROCEDURE — 84153 ASSAY OF PSA TOTAL: CPT

## 2023-06-04 SDOH — ECONOMIC STABILITY: TRANSPORTATION INSECURITY
IN THE PAST 12 MONTHS, HAS THE LACK OF TRANSPORTATION KEPT YOU FROM MEDICAL APPOINTMENTS OR FROM GETTING MEDICATIONS?: PATIENT DECLINED

## 2023-06-04 SDOH — ECONOMIC STABILITY: FOOD INSECURITY: WITHIN THE PAST 12 MONTHS, YOU WORRIED THAT YOUR FOOD WOULD RUN OUT BEFORE YOU GOT MONEY TO BUY MORE.: PATIENT DECLINED

## 2023-06-04 SDOH — HEALTH STABILITY: MENTAL HEALTH
STRESS IS WHEN SOMEONE FEELS TENSE, NERVOUS, ANXIOUS, OR CAN'T SLEEP AT NIGHT BECAUSE THEIR MIND IS TROUBLED. HOW STRESSED ARE YOU?: RATHER MUCH

## 2023-06-04 SDOH — ECONOMIC STABILITY: HOUSING INSECURITY
IN THE LAST 12 MONTHS, WAS THERE A TIME WHEN YOU DID NOT HAVE A STEADY PLACE TO SLEEP OR SLEPT IN A SHELTER (INCLUDING NOW)?: PATIENT REFUSED

## 2023-06-04 SDOH — ECONOMIC STABILITY: INCOME INSECURITY: HOW HARD IS IT FOR YOU TO PAY FOR THE VERY BASICS LIKE FOOD, HOUSING, MEDICAL CARE, AND HEATING?: PATIENT DECLINED

## 2023-06-04 SDOH — ECONOMIC STABILITY: FOOD INSECURITY: WITHIN THE PAST 12 MONTHS, THE FOOD YOU BOUGHT JUST DIDN'T LAST AND YOU DIDN'T HAVE MONEY TO GET MORE.: PATIENT DECLINED

## 2023-06-04 SDOH — ECONOMIC STABILITY: HOUSING INSECURITY

## 2023-06-04 SDOH — HEALTH STABILITY: PHYSICAL HEALTH: ON AVERAGE, HOW MANY DAYS PER WEEK DO YOU ENGAGE IN MODERATE TO STRENUOUS EXERCISE (LIKE A BRISK WALK)?: 7 DAYS

## 2023-06-04 SDOH — ECONOMIC STABILITY: TRANSPORTATION INSECURITY
IN THE PAST 12 MONTHS, HAS LACK OF RELIABLE TRANSPORTATION KEPT YOU FROM MEDICAL APPOINTMENTS, MEETINGS, WORK OR FROM GETTING THINGS NEEDED FOR DAILY LIVING?: PATIENT DECLINED

## 2023-06-04 SDOH — HEALTH STABILITY: PHYSICAL HEALTH: ON AVERAGE, HOW MANY MINUTES DO YOU ENGAGE IN EXERCISE AT THIS LEVEL?: 10 MIN

## 2023-06-04 SDOH — ECONOMIC STABILITY: TRANSPORTATION INSECURITY
IN THE PAST 12 MONTHS, HAS LACK OF TRANSPORTATION KEPT YOU FROM MEETINGS, WORK, OR FROM GETTING THINGS NEEDED FOR DAILY LIVING?: PATIENT DECLINED

## 2023-06-04 SDOH — ECONOMIC STABILITY: INCOME INSECURITY: IN THE LAST 12 MONTHS, WAS THERE A TIME WHEN YOU WERE NOT ABLE TO PAY THE MORTGAGE OR RENT ON TIME?: PATIENT REFUSED

## 2023-06-04 ASSESSMENT — SOCIAL DETERMINANTS OF HEALTH (SDOH)
HOW OFTEN DO YOU GET TOGETHER WITH FRIENDS OR RELATIVES?: PATIENT DECLINED
HOW MANY DRINKS CONTAINING ALCOHOL DO YOU HAVE ON A TYPICAL DAY WHEN YOU ARE DRINKING: PATIENT DECLINED
HOW OFTEN DO YOU ATTEND CHURCH OR RELIGIOUS SERVICES?: PATIENT DECLINED
IN A TYPICAL WEEK, HOW MANY TIMES DO YOU TALK ON THE PHONE WITH FAMILY, FRIENDS, OR NEIGHBORS?: PATIENT DECLINED
DO YOU BELONG TO ANY CLUBS OR ORGANIZATIONS SUCH AS CHURCH GROUPS UNIONS, FRATERNAL OR ATHLETIC GROUPS, OR SCHOOL GROUPS?: PATIENT DECLINED
HOW OFTEN DO YOU HAVE SIX OR MORE DRINKS ON ONE OCCASION: PATIENT DECLINED
HOW OFTEN DO YOU HAVE A DRINK CONTAINING ALCOHOL: PATIENT DECLINED
HOW OFTEN DO YOU ATTEND CHURCH OR RELIGIOUS SERVICES?: PATIENT DECLINED
HOW OFTEN DO YOU GET TOGETHER WITH FRIENDS OR RELATIVES?: PATIENT DECLINED
HOW OFTEN DO YOU ATTENT MEETINGS OF THE CLUB OR ORGANIZATION YOU BELONG TO?: PATIENT DECLINED
IN A TYPICAL WEEK, HOW MANY TIMES DO YOU TALK ON THE PHONE WITH FAMILY, FRIENDS, OR NEIGHBORS?: PATIENT DECLINED
HOW OFTEN DO YOU ATTENT MEETINGS OF THE CLUB OR ORGANIZATION YOU BELONG TO?: PATIENT DECLINED
DO YOU BELONG TO ANY CLUBS OR ORGANIZATIONS SUCH AS CHURCH GROUPS UNIONS, FRATERNAL OR ATHLETIC GROUPS, OR SCHOOL GROUPS?: PATIENT DECLINED
HOW HARD IS IT FOR YOU TO PAY FOR THE VERY BASICS LIKE FOOD, HOUSING, MEDICAL CARE, AND HEATING?: PATIENT DECLINED
WITHIN THE PAST 12 MONTHS, YOU WORRIED THAT YOUR FOOD WOULD RUN OUT BEFORE YOU GOT THE MONEY TO BUY MORE: PATIENT DECLINED

## 2023-06-04 ASSESSMENT — LIFESTYLE VARIABLES
HOW OFTEN DO YOU HAVE A DRINK CONTAINING ALCOHOL: PATIENT DECLINED
HOW MANY STANDARD DRINKS CONTAINING ALCOHOL DO YOU HAVE ON A TYPICAL DAY: PATIENT DECLINED
SKIP TO QUESTIONS 9-10: 0
HOW OFTEN DO YOU HAVE SIX OR MORE DRINKS ON ONE OCCASION: PATIENT DECLINED
AUDIT-C TOTAL SCORE: -1

## 2023-06-07 ENCOUNTER — OFFICE VISIT (OUTPATIENT)
Dept: MEDICAL GROUP | Facility: MEDICAL CENTER | Age: 58
End: 2023-06-07
Payer: COMMERCIAL

## 2023-06-07 VITALS
OXYGEN SATURATION: 97 % | SYSTOLIC BLOOD PRESSURE: 122 MMHG | DIASTOLIC BLOOD PRESSURE: 84 MMHG | WEIGHT: 223 LBS | BODY MASS INDEX: 33.03 KG/M2 | TEMPERATURE: 97 F | HEART RATE: 75 BPM | HEIGHT: 69 IN

## 2023-06-07 DIAGNOSIS — Z79.899 CHRONIC PRESCRIPTION BENZODIAZEPINE USE: ICD-10-CM

## 2023-06-07 DIAGNOSIS — E78.2 MIXED HYPERLIPIDEMIA: ICD-10-CM

## 2023-06-07 DIAGNOSIS — Z00.00 ANNUAL PHYSICAL EXAM: ICD-10-CM

## 2023-06-07 DIAGNOSIS — I10 ESSENTIAL HYPERTENSION: ICD-10-CM

## 2023-06-07 DIAGNOSIS — R73.03 PREDIABETES: ICD-10-CM

## 2023-06-07 DIAGNOSIS — K21.9 GASTROESOPHAGEAL REFLUX DISEASE WITHOUT ESOPHAGITIS: ICD-10-CM

## 2023-06-07 DIAGNOSIS — M1A.0710 CHRONIC IDIOPATHIC GOUT INVOLVING TOE OF RIGHT FOOT WITHOUT TOPHUS: ICD-10-CM

## 2023-06-07 DIAGNOSIS — F41.9 ANXIETY: ICD-10-CM

## 2023-06-07 DIAGNOSIS — Z23 NEED FOR VACCINATION: ICD-10-CM

## 2023-06-07 PROCEDURE — 90746 HEPB VACCINE 3 DOSE ADULT IM: CPT | Performed by: NURSE PRACTITIONER

## 2023-06-07 PROCEDURE — 3074F SYST BP LT 130 MM HG: CPT | Performed by: NURSE PRACTITIONER

## 2023-06-07 PROCEDURE — 90471 IMMUNIZATION ADMIN: CPT | Performed by: NURSE PRACTITIONER

## 2023-06-07 PROCEDURE — 99396 PREV VISIT EST AGE 40-64: CPT | Mod: 25 | Performed by: NURSE PRACTITIONER

## 2023-06-07 PROCEDURE — 3079F DIAST BP 80-89 MM HG: CPT | Performed by: NURSE PRACTITIONER

## 2023-06-07 RX ORDER — ATENOLOL 50 MG/1
50 TABLET ORAL
Qty: 90 TABLET | Refills: 3 | Status: SHIPPED | OUTPATIENT
Start: 2023-06-07

## 2023-06-07 RX ORDER — ALPRAZOLAM 0.5 MG/1
0.5 TABLET ORAL NIGHTLY PRN
Qty: 30 TABLET | Refills: 2 | Status: SHIPPED | OUTPATIENT
Start: 2023-06-07 | End: 2023-07-07

## 2023-06-07 RX ORDER — LISINOPRIL 20 MG/1
20 TABLET ORAL
Qty: 90 TABLET | Refills: 3 | Status: SHIPPED | OUTPATIENT
Start: 2023-06-07

## 2023-06-07 RX ORDER — OMEPRAZOLE 20 MG/1
20 CAPSULE, DELAYED RELEASE ORAL
Qty: 90 CAPSULE | Refills: 3 | Status: SHIPPED | OUTPATIENT
Start: 2023-06-07

## 2023-06-07 RX ORDER — LOVASTATIN 40 MG/1
40 TABLET ORAL EVERY EVENING
Qty: 90 TABLET | Refills: 3 | Status: SHIPPED | OUTPATIENT
Start: 2023-06-07

## 2023-06-07 ASSESSMENT — FIBROSIS 4 INDEX: FIB4 SCORE: 0.67

## 2023-06-07 NOTE — ASSESSMENT & PLAN NOTE
Chronic. Previously on lexapro but is off this now. Feels his day to day anxiety is well managed with lifestyle, however he does have occasional issues with spikes in anxiety causing insomnia for which he uses alprazolam as needed.  Using around twice monthly, will take half tablet at night. Last refill 4 months ago, 30 tabs. Has been using more often due to new stressful job, requesting refill today.     Alcohol use is very rare.

## 2023-06-07 NOTE — ASSESSMENT & PLAN NOTE
Chronic. Stable on lisinopril 20 mg daily and atenolol 50 mg daily. Checks BP at home occasionally, getting error codes at home, needs new monitor.     Denies chest pain, headache, dizziness.     Established with ophthalmology

## 2023-06-14 NOTE — PROGRESS NOTES
Subjective:   Danny Guerrero is a 57 y.o. male here today for annual, lab review, medication refills    GERD (gastroesophageal reflux disease)  Chronic. Stable on omeprazole as needed, refilled    HTN (hypertension)  Chronic. Stable on lisinopril 20 mg daily and atenolol 50 mg daily. Checks BP at home occasionally, getting error codes at home, needs new monitor.     Denies chest pain, headache, dizziness.     Established with ophthalmology    Hyperlipidemia  Chronic. Stable on lovastatin 40 mg daily. Denies myalgias or abdominal pain.     Chronic idiopathic gout involving toe of right foot without tophus  Chronic. Stable with low purine diet and indomethacin as needed.     Anxiety  Chronic. Previously on lexapro but is off this now. Feels his day to day anxiety is well managed with lifestyle, however he does have occasional issues with spikes in anxiety causing insomnia for which he uses alprazolam as needed.  Using around twice monthly, will take half tablet at night. Last refill 4 months ago, 30 tabs. Has been using more often due to new stressful job, requesting refill today.     Alcohol use is very rare.     Prediabetes  Chronic. A1C stable at 6.2. Working on diet, needs to increase exercise, working on weight loss.      Current medicines (including changes today)  Current Outpatient Medications   Medication Sig Dispense Refill    lovastatin (MEVACOR) 40 MG tablet Take 1 Tablet by mouth every evening. 90 Tablet 3    lisinopril (PRINIVIL) 20 MG Tab Take 1 Tablet by mouth every day. 90 Tablet 3    atenolol (TENORMIN) 50 MG Tab Take 1 Tablet by mouth every day. 90 Tablet 3    omeprazole (PRILOSEC) 20 MG delayed-release capsule Take 1 Capsule by mouth every day. 90 Capsule 3    ALPRAZolam (XANAX) 0.5 MG Tab Take 1 Tablet by mouth at bedtime as needed for Sleep for up to 30 days. 30 Tablet 2    indomethacin (INDOCIN) 50 MG Cap Take 1 Capsule by mouth 3 times a day. For about 5-7 days. Discontinue 2-3 days after  "resolution of symptoms. 30 Capsule 1    latanoprost (XALATAN) 0.005 % Solution PLACE 1 DROP INTO BOTH EYES AT BEDTIME  4    docosahexanoic acid (OMEGA 3 FA) 1000 MG Cap Take 2,000 mg by mouth every day.       No current facility-administered medications for this visit.     He  has a past medical history of Allergic rhinitis, Anxiety, Depression, GERD (gastroesophageal reflux disease), Glaucoma, HTN, Hyperlipidemia, Impaired fasting glucose, Kidney stones, Primary osteoarthritis of hip, and Vitamin D deficiency.    He has no past medical history of Encounter for long-term (current) use of other medications.    ROS   No chest pain, no shortness of breath, no abdominal pain  Positive ROS as per HPI.  All other systems reviewed and are negative.     Objective:     /84   Pulse 75   Temp 36.1 °C (97 °F) (Temporal)   Ht 1.753 m (5' 9\")   Wt 101 kg (223 lb)   SpO2 97%  Body mass index is 32.93 kg/m².     Physical Exam:  Constitutional: Alert, no distress.  Skin: Warm, dry, good turgor, no rashes in visible areas.  Eye: Equal, round and reactive, conjunctiva clear, lids normal.  ENMT: Lips without lesions, good dentition, oropharynx clear.  Neck: Trachea midline, no masses, no thyromegaly. No cervical or supraclavicular lymphadenopathy  Respiratory: Unlabored respiratory effort, lungs clear to auscultation, no wheezes, no ronchi.  Cardiovascular: Normal S1, S2, no murmur, no edema.  Abdomen: Soft, non-tender, no masses, no hepatosplenomegaly.  Psych: Alert and oriented x3, normal affect and mood.        Assessment and Plan:   The following treatment plan was discussed    1. Annual physical exam  Annual labs and health maintenance reviewed and updated.   Patient and I discussed the importance of lifestyle changes, with particular emphasis on decreasing sugar and carbohydrate intake and increasing plant-based nutrition (for the purposes of weight loss, general health, and prevention of chronic illnesses), as well as " regular cardiovascular exercise, proper sleep, and stress management. Patient verbalized understanding.    2. Mixed hyperlipidemia  Stable on lovastatin, refilled  - lovastatin (MEVACOR) 40 MG tablet; Take 1 Tablet by mouth every evening.  Dispense: 90 Tablet; Refill: 3    3. Essential hypertension  Stable on current medications, refilled  - lisinopril (PRINIVIL) 20 MG Tab; Take 1 Tablet by mouth every day.  Dispense: 90 Tablet; Refill: 3  - atenolol (TENORMIN) 50 MG Tab; Take 1 Tablet by mouth every day.  Dispense: 90 Tablet; Refill: 3    4. Gastroesophageal reflux disease without esophagitis  Stable on omeprazole as needed, refilled  - omeprazole (PRILOSEC) 20 MG delayed-release capsule; Take 1 Capsule by mouth every day.  Dispense: 90 Capsule; Refill: 3    5. Anxiety  Stable on alprazolam sparingly, refilled   reviewed and consistent  UDS and contract up to date  - ALPRAZolam (XANAX) 0.5 MG Tab; Take 1 Tablet by mouth at bedtime as needed for Sleep for up to 30 days.  Dispense: 30 Tablet; Refill: 2    6. Chronic prescription benzodiazepine use  - PAIN MANAGEMENT SCRN, UR; Future    7. Chronic idiopathic gout involving toe of right foot without tophus  Stable on indomethacin as needed  Continue low purine diet    9. Prediabetes  Stable  Continue to work on diet, exercise, weight loss    10. Need for vaccination  - Hep B Adult 20+      Followup: Return in about 1 year (around 6/7/2024).    The MA is performing the above orders under the direction of Dr. Damon    Please note that this dictation was created using voice recognition software. I have made every reasonable attempt to correct obvious errors, but I expect that there are errors of grammar and possibly content that I did not discover before finalizing the note.

## 2024-03-09 DIAGNOSIS — F41.9 ANXIETY: ICD-10-CM

## 2024-03-11 NOTE — TELEPHONE ENCOUNTER
ALPRAZolam Oral Tablet 0.5 MG     Received request via: Pharmacy    Was the patient seen in the last year in this department? Yes    Does the patient have an active prescription (recently filled or refills available) for medication(s) requested? No    Pharmacy Name: : [id number] - [place], [place] - [address]     Does the patient have USP Plus and need 100 day supply (blood pressure, diabetes and cholesterol meds only)? Patient does not have SCP

## 2024-03-12 RX ORDER — ALPRAZOLAM 0.5 MG/1
0.5 TABLET ORAL
Qty: 30 TABLET | Refills: 0 | OUTPATIENT
Start: 2024-03-12

## 2024-04-04 ENCOUNTER — APPOINTMENT (OUTPATIENT)
Dept: MEDICAL GROUP | Facility: MEDICAL CENTER | Age: 59
End: 2024-04-04
Payer: COMMERCIAL

## 2024-04-05 ENCOUNTER — TELEMEDICINE (OUTPATIENT)
Dept: MEDICAL GROUP | Facility: MEDICAL CENTER | Age: 59
End: 2024-04-05
Payer: COMMERCIAL

## 2024-04-05 DIAGNOSIS — R73.03 PREDIABETES: ICD-10-CM

## 2024-04-05 DIAGNOSIS — E55.9 VITAMIN D DEFICIENCY: ICD-10-CM

## 2024-04-05 DIAGNOSIS — Z00.00 ANNUAL PHYSICAL EXAM: ICD-10-CM

## 2024-04-05 DIAGNOSIS — Z11.4 ENCOUNTER FOR SCREENING FOR HIV: ICD-10-CM

## 2024-04-05 DIAGNOSIS — M1A.0710 CHRONIC IDIOPATHIC GOUT INVOLVING TOE OF RIGHT FOOT WITHOUT TOPHUS: ICD-10-CM

## 2024-04-05 DIAGNOSIS — E78.2 MIXED HYPERLIPIDEMIA: ICD-10-CM

## 2024-04-05 DIAGNOSIS — I10 PRIMARY HYPERTENSION: ICD-10-CM

## 2024-04-05 DIAGNOSIS — F41.9 ANXIETY: ICD-10-CM

## 2024-04-05 PROCEDURE — 99214 OFFICE O/P EST MOD 30 MIN: CPT | Mod: 95 | Performed by: NURSE PRACTITIONER

## 2024-04-05 RX ORDER — ALPRAZOLAM 0.5 MG/1
0.5 TABLET ORAL NIGHTLY PRN
Qty: 30 TABLET | Refills: 2 | Status: SHIPPED | OUTPATIENT
Start: 2024-04-05 | End: 2024-05-05

## 2024-04-05 RX ORDER — INDOMETHACIN 50 MG/1
50 CAPSULE ORAL 3 TIMES DAILY
Qty: 30 CAPSULE | Refills: 1 | Status: SHIPPED | OUTPATIENT
Start: 2024-04-05

## 2024-04-05 NOTE — ASSESSMENT & PLAN NOTE
Chronic. Stable with low purine diet and indomethacin as needed. Requesting refills of indomethacin

## 2024-04-05 NOTE — PROGRESS NOTES
Telemedicine: Established Patient   This evaluation was conducted via Zoom using secure and encrypted videoconferencing technology. The patient was in their home in the [facility].    The patient's identity was confirmed and verbal consent was obtained for this virtual visit.    Subjective:   CC: Medication refills  Danny Guerrero is a 58 y.o. male presenting for evaluation and management of:    Anxiety  Chronic. Previously on lexapro but is off this now. Feels his day to day anxiety is well managed with lifestyle, however he does have occasional issues with spikes in anxiety causing insomnia for which he uses alprazolam as needed.  Using around twice monthly, will take half tablet at night. Last refill 10 months ago, 30 tabs. Requesting refills today.     Alcohol use is very rare.     Chronic idiopathic gout involving toe of right foot without tophus  Chronic. Stable with low purine diet and indomethacin as needed. Requesting refills of indomethacin     ROS   Positive ROS as per HPI. All other systems reviewed and are negative.    Allergies   Allergen Reactions    Nkda [No Known Drug Allergy]        Current medicines (including changes today)  Current Outpatient Medications   Medication Sig Dispense Refill    indomethacin (INDOCIN) 50 MG Cap Take 1 Capsule by mouth 3 times a day. For about 5-7 days. Discontinue 2-3 days after resolution of symptoms. 30 Capsule 1    ALPRAZolam (XANAX) 0.5 MG Tab Take 1 Tablet by mouth at bedtime as needed for Sleep for up to 30 days. 30 Tablet 2    lovastatin (MEVACOR) 40 MG tablet Take 1 Tablet by mouth every evening. 90 Tablet 3    lisinopril (PRINIVIL) 20 MG Tab Take 1 Tablet by mouth every day. 90 Tablet 3    atenolol (TENORMIN) 50 MG Tab Take 1 Tablet by mouth every day. 90 Tablet 3    omeprazole (PRILOSEC) 20 MG delayed-release capsule Take 1 Capsule by mouth every day. 90 Capsule 3    latanoprost (XALATAN) 0.005 % Solution PLACE 1 DROP INTO BOTH EYES AT BEDTIME  4     docosahexanoic acid (OMEGA 3 FA) 1000 MG Cap Take 2,000 mg by mouth every day.       No current facility-administered medications for this visit.       Patient Active Problem List    Diagnosis Date Noted    Diverticulosis 02/24/2023    Benign prostatic hyperplasia with urinary obstruction 11/02/2022    Erectile dysfunction 11/02/2022    Erectile dysfunction due to arterial insufficiency 11/02/2022    Nocturia 11/02/2022    Microscopic hematuria 08/24/2022    Right foot pain 04/13/2022    Urinary hesitancy 04/13/2022    Chronic idiopathic gout involving toe of right foot without tophus 04/13/2022    Left arm pain 05/05/2021    Moderate episode of recurrent major depressive disorder (HCC) 05/17/2019    Obesity (BMI 30-39.9) 09/14/2017    Primary osteoarthritis of left hip 08/11/2017    Prediabetes 02/24/2016    Anxiety 02/24/2016    Vitamin D deficiency     HTN (hypertension) 02/05/2014    Glaucoma     Preventative health care 02/17/2011    Allergic rhinitis due to allergen 07/01/2010    Hyperlipidemia 07/07/2009    GERD (gastroesophageal reflux disease) 07/07/2009    Kidney stones        Family History   Problem Relation Age of Onset    Hypertension Father        He  has a past medical history of Allergic rhinitis, Anxiety, Depression, GERD (gastroesophageal reflux disease), Glaucoma, HTN, Hyperlipidemia, Impaired fasting glucose, Kidney stones, Primary osteoarthritis of hip, and Vitamin D deficiency.  He  has no past surgical history on file.       Objective:   There were no vitals taken for this visit.    Physical Exam:  Constitutional: Alert, no distress, well-groomed.  Skin: No rashes in visible areas.  Eye: Round. Conjunctiva clear, lids normal. No icterus.   ENMT: Lips pink without lesions, good dentition, moist mucous membranes. Phonation normal.  Neck: No masses, no thyromegaly. Moves freely without pain.  CV: Pulse as reported by patient  Respiratory: Unlabored respiratory effort, no cough or audible  wheeze  Psych: Alert and oriented x3, normal affect and mood.       Assessment and Plan:   The following treatment plan was discussed:     1. Anxiety  Stable on alprazolam as needed  Discussed daily medication if anxiety is worsening, he would like to hold off for now.   Alprazolam refilled   reviewed and consistent  Controlled substance agreement up to date  - ALPRAZolam (XANAX) 0.5 MG Tab; Take 1 Tablet by mouth at bedtime as needed for Sleep for up to 30 days.  Dispense: 30 Tablet; Refill: 2    2. Chronic idiopathic gout involving toe of right foot  Unstable, current flare  Continue low purine diet and alcohol avoidance  Refilled indomethacin  - indomethacin (INDOCIN) 50 MG Cap; Take 1 Capsule by mouth 3 times a day. For about 5-7 days. Discontinue 2-3 days after resolution of symptoms.  Dispense: 30 Capsule; Refill: 1  - URIC ACID; Future    3. Annual physical exam  - CBC WITH DIFFERENTIAL; Future  - Comp Metabolic Panel; Future  - URIC ACID; Future  - VITAMIN D,25 HYDROXY (DEFICIENCY); Future  - PROSTATE SPECIFIC AG SCREENING; Future  - TSH WITH REFLEX TO FT4; Future  - Lipid Profile; Future  - MICROALBUMIN CREAT RATIO URINE; Future  - HEMOGLOBIN A1C; Future    4. Vitamin D deficiency  - VITAMIN D,25 HYDROXY (DEFICIENCY); Future    5. Prediabetes  - HEMOGLOBIN A1C; Future    6. Mixed hyperlipidemia  - Lipid Profile; Future    7. Primary hypertension  - MICROALBUMIN CREAT RATIO URINE; Future    8. Encounter for screening for HIV  - HIV AG/AB COMBO ASSAY SCREENING; Future          Follow-up: Return in about 2 months (around 6/5/2024) for Annual, Results Review.

## 2024-04-05 NOTE — ASSESSMENT & PLAN NOTE
Chronic. Previously on lexapro but is off this now. Feels his day to day anxiety is well managed with lifestyle, however he does have occasional issues with spikes in anxiety causing insomnia for which he uses alprazolam as needed.  Using around twice monthly, will take half tablet at night. Last refill 10 months ago, 30 tabs. Requesting refills today.     Alcohol use is very rare.

## 2024-05-24 DIAGNOSIS — I10 ESSENTIAL HYPERTENSION: ICD-10-CM

## 2024-05-24 DIAGNOSIS — E78.2 MIXED HYPERLIPIDEMIA: ICD-10-CM

## 2024-05-29 NOTE — TELEPHONE ENCOUNTER
Received request via: Pharmacy    Was the patient seen in the last year in this department? Yes    Does the patient have an active prescription (recently filled or refills available) for medication(s) requested? No    [facility]    Does the patient have retirement Plus and need 100 day supply (blood pressure, diabetes and cholesterol meds only)? Patient does not have SCP

## 2024-05-30 RX ORDER — LISINOPRIL 20 MG/1
20 TABLET ORAL DAILY
Qty: 90 TABLET | Refills: 0 | Status: SHIPPED | OUTPATIENT
Start: 2024-05-30

## 2024-05-30 RX ORDER — ATENOLOL 50 MG/1
50 TABLET ORAL DAILY
Qty: 90 TABLET | Refills: 0 | Status: SHIPPED | OUTPATIENT
Start: 2024-05-30

## 2024-05-30 RX ORDER — LOVASTATIN 40 MG/1
40 TABLET ORAL EVERY EVENING
Qty: 90 TABLET | Refills: 0 | Status: SHIPPED | OUTPATIENT
Start: 2024-05-30

## 2024-05-30 NOTE — TELEPHONE ENCOUNTER
Dear Danny,     Your prescription for   Requested Prescriptions     Signed Prescriptions Disp Refills    lisinopril (PRINIVIL) 20 MG Tab 90 Tablet 0     Sig: TAKE ONE TABLET BY MOUTH ONE TIME DAILY     Authorizing Provider: ALEXANDRA ZIMMER    atenolol (TENORMIN) 50 MG Tab 90 Tablet 0     Sig: TAKE ONE TABLET BY MOUTH ONE TIME DAILY     Authorizing Provider: ALEXANDRA ZIMMER    lovastatin (MEVACOR) 40 MG tablet 90 Tablet 0     Sig: TAKE ONE TABLET BY MOUTH IN THE EVENING     Authorizing Provider: ALEXANDRA ZIMMER        has been sent to the   [id number] - [place], [place] - [address]  [address] [place] [address]  Phone: [phone] Fax: [phone]      Please contact your pharmacy to see when it will be ready for you to .      Thank you,     Evelio Lancaster, Med Ass't

## 2024-06-01 ENCOUNTER — HOSPITAL ENCOUNTER (OUTPATIENT)
Dept: LAB | Facility: MEDICAL CENTER | Age: 59
End: 2024-06-01
Attending: NURSE PRACTITIONER
Payer: COMMERCIAL

## 2024-06-01 DIAGNOSIS — M1A.0710 CHRONIC IDIOPATHIC GOUT INVOLVING TOE OF RIGHT FOOT WITHOUT TOPHUS: ICD-10-CM

## 2024-06-01 DIAGNOSIS — E55.9 VITAMIN D DEFICIENCY: ICD-10-CM

## 2024-06-01 DIAGNOSIS — R73.03 PREDIABETES: ICD-10-CM

## 2024-06-01 DIAGNOSIS — E78.2 MIXED HYPERLIPIDEMIA: ICD-10-CM

## 2024-06-01 DIAGNOSIS — Z00.00 ANNUAL PHYSICAL EXAM: ICD-10-CM

## 2024-06-01 DIAGNOSIS — Z11.4 ENCOUNTER FOR SCREENING FOR HIV: ICD-10-CM

## 2024-06-01 DIAGNOSIS — I10 PRIMARY HYPERTENSION: ICD-10-CM

## 2024-06-01 LAB
25(OH)D3 SERPL-MCNC: 29 NG/ML (ref 30–100)
ALBUMIN SERPL BCP-MCNC: 4 G/DL (ref 3.2–4.9)
ALBUMIN/GLOB SERPL: 1.2 G/DL
ALP SERPL-CCNC: 69 U/L (ref 30–99)
ALT SERPL-CCNC: 18 U/L (ref 2–50)
ANION GAP SERPL CALC-SCNC: 10 MMOL/L (ref 7–16)
AST SERPL-CCNC: 13 U/L (ref 12–45)
BASOPHILS # BLD AUTO: 1 % (ref 0–1.8)
BASOPHILS # BLD: 0.06 K/UL (ref 0–0.12)
BILIRUB SERPL-MCNC: 0.4 MG/DL (ref 0.1–1.5)
BUN SERPL-MCNC: 13 MG/DL (ref 8–22)
CALCIUM ALBUM COR SERPL-MCNC: 9.2 MG/DL (ref 8.5–10.5)
CALCIUM SERPL-MCNC: 9.2 MG/DL (ref 8.5–10.5)
CHLORIDE SERPL-SCNC: 102 MMOL/L (ref 96–112)
CHOLEST SERPL-MCNC: 124 MG/DL (ref 100–199)
CO2 SERPL-SCNC: 25 MMOL/L (ref 20–33)
CREAT SERPL-MCNC: 0.98 MG/DL (ref 0.5–1.4)
CREAT UR-MCNC: 81.05 MG/DL
EOSINOPHIL # BLD AUTO: 0.2 K/UL (ref 0–0.51)
EOSINOPHIL NFR BLD: 3.4 % (ref 0–6.9)
ERYTHROCYTE [DISTWIDTH] IN BLOOD BY AUTOMATED COUNT: 41 FL (ref 35.9–50)
EST. AVERAGE GLUCOSE BLD GHB EST-MCNC: 134 MG/DL
GFR SERPLBLD CREATININE-BSD FMLA CKD-EPI: 89 ML/MIN/1.73 M 2
GLOBULIN SER CALC-MCNC: 3.3 G/DL (ref 1.9–3.5)
GLUCOSE SERPL-MCNC: 94 MG/DL (ref 65–99)
HBA1C MFR BLD: 6.3 % (ref 4–5.6)
HCT VFR BLD AUTO: 45.5 % (ref 42–52)
HDLC SERPL-MCNC: 34 MG/DL
HGB BLD-MCNC: 15.2 G/DL (ref 14–18)
HIV 1+2 AB+HIV1 P24 AG SERPL QL IA: NORMAL
IMM GRANULOCYTES # BLD AUTO: 0.01 K/UL (ref 0–0.11)
IMM GRANULOCYTES NFR BLD AUTO: 0.2 % (ref 0–0.9)
LDLC SERPL CALC-MCNC: 71 MG/DL
LYMPHOCYTES # BLD AUTO: 2.39 K/UL (ref 1–4.8)
LYMPHOCYTES NFR BLD: 40.3 % (ref 22–41)
MCH RBC QN AUTO: 29.1 PG (ref 27–33)
MCHC RBC AUTO-ENTMCNC: 33.4 G/DL (ref 32.3–36.5)
MCV RBC AUTO: 87 FL (ref 81.4–97.8)
MICROALBUMIN UR-MCNC: <1.2 MG/DL
MICROALBUMIN/CREAT UR: NORMAL MG/G (ref 0–30)
MONOCYTES # BLD AUTO: 0.5 K/UL (ref 0–0.85)
MONOCYTES NFR BLD AUTO: 8.4 % (ref 0–13.4)
NEUTROPHILS # BLD AUTO: 2.77 K/UL (ref 1.82–7.42)
NEUTROPHILS NFR BLD: 46.7 % (ref 44–72)
NRBC # BLD AUTO: 0 K/UL
NRBC BLD-RTO: 0 /100 WBC (ref 0–0.2)
PLATELET # BLD AUTO: 285 K/UL (ref 164–446)
PMV BLD AUTO: 10.3 FL (ref 9–12.9)
POTASSIUM SERPL-SCNC: 4.6 MMOL/L (ref 3.6–5.5)
PROT SERPL-MCNC: 7.3 G/DL (ref 6–8.2)
PSA SERPL-MCNC: 2.18 NG/ML (ref 0–4)
RBC # BLD AUTO: 5.23 M/UL (ref 4.7–6.1)
SODIUM SERPL-SCNC: 137 MMOL/L (ref 135–145)
TRIGL SERPL-MCNC: 96 MG/DL (ref 0–149)
TSH SERPL DL<=0.005 MIU/L-ACNC: 4.17 UIU/ML (ref 0.38–5.33)
URATE SERPL-MCNC: 7.9 MG/DL (ref 2.5–8.3)
WBC # BLD AUTO: 5.9 K/UL (ref 4.8–10.8)

## 2024-06-01 PROCEDURE — 84550 ASSAY OF BLOOD/URIC ACID: CPT

## 2024-06-01 PROCEDURE — 83036 HEMOGLOBIN GLYCOSYLATED A1C: CPT

## 2024-06-01 PROCEDURE — 36415 COLL VENOUS BLD VENIPUNCTURE: CPT

## 2024-06-01 PROCEDURE — 85025 COMPLETE CBC W/AUTO DIFF WBC: CPT

## 2024-06-01 PROCEDURE — 82570 ASSAY OF URINE CREATININE: CPT

## 2024-06-01 PROCEDURE — 80061 LIPID PANEL: CPT

## 2024-06-01 PROCEDURE — 84443 ASSAY THYROID STIM HORMONE: CPT

## 2024-06-01 PROCEDURE — 84153 ASSAY OF PSA TOTAL: CPT

## 2024-06-01 PROCEDURE — 82306 VITAMIN D 25 HYDROXY: CPT

## 2024-06-01 PROCEDURE — 87389 HIV-1 AG W/HIV-1&-2 AB AG IA: CPT

## 2024-06-01 PROCEDURE — 82043 UR ALBUMIN QUANTITATIVE: CPT

## 2024-06-01 PROCEDURE — 80053 COMPREHEN METABOLIC PANEL: CPT

## 2024-08-25 DIAGNOSIS — I10 ESSENTIAL HYPERTENSION: ICD-10-CM

## 2024-08-25 DIAGNOSIS — E78.2 MIXED HYPERLIPIDEMIA: ICD-10-CM

## 2024-08-26 RX ORDER — ATENOLOL 50 MG/1
50 TABLET ORAL DAILY
Qty: 90 TABLET | Refills: 0 | Status: SHIPPED | OUTPATIENT
Start: 2024-08-26

## 2024-08-26 RX ORDER — LOVASTATIN 40 MG
40 TABLET ORAL EVERY EVENING
Qty: 90 TABLET | Refills: 0 | Status: SHIPPED | OUTPATIENT
Start: 2024-08-26

## 2024-08-26 RX ORDER — LISINOPRIL 20 MG/1
20 TABLET ORAL DAILY
Qty: 90 TABLET | Refills: 0 | Status: SHIPPED | OUTPATIENT
Start: 2024-08-26

## 2024-09-23 SDOH — HEALTH STABILITY: PHYSICAL HEALTH: ON AVERAGE, HOW MANY MINUTES DO YOU ENGAGE IN EXERCISE AT THIS LEVEL?: 10 MIN

## 2024-09-23 SDOH — ECONOMIC STABILITY: INCOME INSECURITY: IN THE LAST 12 MONTHS, WAS THERE A TIME WHEN YOU WERE NOT ABLE TO PAY THE MORTGAGE OR RENT ON TIME?: NO

## 2024-09-23 SDOH — ECONOMIC STABILITY: FOOD INSECURITY: WITHIN THE PAST 12 MONTHS, YOU WORRIED THAT YOUR FOOD WOULD RUN OUT BEFORE YOU GOT MONEY TO BUY MORE.: PATIENT DECLINED

## 2024-09-23 SDOH — ECONOMIC STABILITY: INCOME INSECURITY: HOW HARD IS IT FOR YOU TO PAY FOR THE VERY BASICS LIKE FOOD, HOUSING, MEDICAL CARE, AND HEATING?: PATIENT DECLINED

## 2024-09-23 SDOH — HEALTH STABILITY: PHYSICAL HEALTH: ON AVERAGE, HOW MANY DAYS PER WEEK DO YOU ENGAGE IN MODERATE TO STRENUOUS EXERCISE (LIKE A BRISK WALK)?: 7 DAYS

## 2024-09-23 SDOH — ECONOMIC STABILITY: FOOD INSECURITY: WITHIN THE PAST 12 MONTHS, THE FOOD YOU BOUGHT JUST DIDN'T LAST AND YOU DIDN'T HAVE MONEY TO GET MORE.: PATIENT DECLINED

## 2024-09-23 ASSESSMENT — LIFESTYLE VARIABLES
HOW MANY STANDARD DRINKS CONTAINING ALCOHOL DO YOU HAVE ON A TYPICAL DAY: 1 OR 2
HOW OFTEN DO YOU HAVE SIX OR MORE DRINKS ON ONE OCCASION: LESS THAN MONTHLY
SKIP TO QUESTIONS 9-10: 0
HOW OFTEN DO YOU HAVE A DRINK CONTAINING ALCOHOL: 2-4 TIMES A MONTH
AUDIT-C TOTAL SCORE: 3

## 2024-09-23 ASSESSMENT — SOCIAL DETERMINANTS OF HEALTH (SDOH)
IN A TYPICAL WEEK, HOW MANY TIMES DO YOU TALK ON THE PHONE WITH FAMILY, FRIENDS, OR NEIGHBORS?: ONCE A WEEK
HOW MANY DRINKS CONTAINING ALCOHOL DO YOU HAVE ON A TYPICAL DAY WHEN YOU ARE DRINKING: 1 OR 2
HOW OFTEN DO YOU ATTENT MEETINGS OF THE CLUB OR ORGANIZATION YOU BELONG TO?: NEVER
HOW OFTEN DO YOU ATTENT MEETINGS OF THE CLUB OR ORGANIZATION YOU BELONG TO?: NEVER
HOW OFTEN DO YOU ATTEND CHURCH OR RELIGIOUS SERVICES?: NEVER
IN A TYPICAL WEEK, HOW MANY TIMES DO YOU TALK ON THE PHONE WITH FAMILY, FRIENDS, OR NEIGHBORS?: ONCE A WEEK
HOW OFTEN DO YOU HAVE SIX OR MORE DRINKS ON ONE OCCASION: LESS THAN MONTHLY
HOW HARD IS IT FOR YOU TO PAY FOR THE VERY BASICS LIKE FOOD, HOUSING, MEDICAL CARE, AND HEATING?: PATIENT DECLINED
HOW OFTEN DO YOU GET TOGETHER WITH FRIENDS OR RELATIVES?: ONCE A WEEK
HOW OFTEN DO YOU GET TOGETHER WITH FRIENDS OR RELATIVES?: ONCE A WEEK
WITHIN THE PAST 12 MONTHS, YOU WORRIED THAT YOUR FOOD WOULD RUN OUT BEFORE YOU GOT THE MONEY TO BUY MORE: PATIENT DECLINED
HOW OFTEN DO YOU HAVE A DRINK CONTAINING ALCOHOL: 2-4 TIMES A MONTH
HOW OFTEN DO YOU ATTEND CHURCH OR RELIGIOUS SERVICES?: NEVER
DO YOU BELONG TO ANY CLUBS OR ORGANIZATIONS SUCH AS CHURCH GROUPS UNIONS, FRATERNAL OR ATHLETIC GROUPS, OR SCHOOL GROUPS?: NO
DO YOU BELONG TO ANY CLUBS OR ORGANIZATIONS SUCH AS CHURCH GROUPS UNIONS, FRATERNAL OR ATHLETIC GROUPS, OR SCHOOL GROUPS?: NO
IN THE PAST 12 MONTHS, HAS THE ELECTRIC, GAS, OIL, OR WATER COMPANY THREATENED TO SHUT OFF SERVICE IN YOUR HOME?: NO

## 2024-09-26 ENCOUNTER — APPOINTMENT (OUTPATIENT)
Dept: MEDICAL GROUP | Facility: MEDICAL CENTER | Age: 59
End: 2024-09-26
Payer: COMMERCIAL

## 2024-09-26 VITALS
RESPIRATION RATE: 18 BRPM | SYSTOLIC BLOOD PRESSURE: 124 MMHG | HEART RATE: 64 BPM | HEIGHT: 69 IN | OXYGEN SATURATION: 94 % | TEMPERATURE: 97.3 F | WEIGHT: 223 LBS | BODY MASS INDEX: 33.03 KG/M2 | DIASTOLIC BLOOD PRESSURE: 72 MMHG

## 2024-09-26 DIAGNOSIS — Z79.899 CHRONIC USE OF BENZODIAZEPINE FOR THERAPEUTIC PURPOSE: ICD-10-CM

## 2024-09-26 DIAGNOSIS — E55.9 VITAMIN D DEFICIENCY: ICD-10-CM

## 2024-09-26 DIAGNOSIS — K21.9 GASTROESOPHAGEAL REFLUX DISEASE WITHOUT ESOPHAGITIS: ICD-10-CM

## 2024-09-26 DIAGNOSIS — F41.0 PANIC ATTACKS: ICD-10-CM

## 2024-09-26 DIAGNOSIS — E78.2 MIXED HYPERLIPIDEMIA: ICD-10-CM

## 2024-09-26 DIAGNOSIS — Z00.00 ANNUAL PHYSICAL EXAM: ICD-10-CM

## 2024-09-26 DIAGNOSIS — Z23 NEED FOR VACCINATION: ICD-10-CM

## 2024-09-26 DIAGNOSIS — F41.9 ANXIETY: ICD-10-CM

## 2024-09-26 DIAGNOSIS — R97.20 INCREASED PROSTATE SPECIFIC ANTIGEN (PSA) VELOCITY: ICD-10-CM

## 2024-09-26 DIAGNOSIS — I10 PRIMARY HYPERTENSION: ICD-10-CM

## 2024-09-26 DIAGNOSIS — R73.03 PREDIABETES: ICD-10-CM

## 2024-09-26 DIAGNOSIS — M1A.0710 CHRONIC IDIOPATHIC GOUT INVOLVING TOE OF RIGHT FOOT WITHOUT TOPHUS: ICD-10-CM

## 2024-09-26 RX ORDER — ALPRAZOLAM 0.5 MG
0.5 TABLET ORAL NIGHTLY PRN
Qty: 30 TABLET | Refills: 2 | Status: SHIPPED | OUTPATIENT
Start: 2024-09-26 | End: 2024-10-26

## 2024-09-26 RX ORDER — INDOMETHACIN 50 MG/1
50 CAPSULE ORAL 3 TIMES DAILY
Qty: 30 CAPSULE | Refills: 1 | Status: SHIPPED | OUTPATIENT
Start: 2024-09-26

## 2024-09-26 RX ORDER — ALLOPURINOL 100 MG/1
100 TABLET ORAL DAILY
Qty: 90 TABLET | Refills: 3 | Status: SHIPPED | OUTPATIENT
Start: 2024-09-26

## 2024-09-26 RX ORDER — COLCHICINE 0.6 MG/1
0.6 TABLET ORAL 2 TIMES DAILY
Qty: 180 TABLET | Refills: 1 | Status: SHIPPED | OUTPATIENT
Start: 2024-09-26

## 2024-09-26 ASSESSMENT — PATIENT HEALTH QUESTIONNAIRE - PHQ9
5. POOR APPETITE OR OVEREATING: NOT AT ALL
2. FEELING DOWN, DEPRESSED, IRRITABLE, OR HOPELESS: NOT AT ALL
4. FEELING TIRED OR HAVING LITTLE ENERGY: NOT AT ALL
1. LITTLE INTEREST OR PLEASURE IN DOING THINGS: NOT AT ALL
SUM OF ALL RESPONSES TO PHQ QUESTIONS 1-9: 0
9. THOUGHTS THAT YOU WOULD BE BETTER OFF DEAD, OR OF HURTING YOURSELF: NOT AT ALL
6. FEELING BAD ABOUT YOURSELF - OR THAT YOU ARE A FAILURE OR HAVE LET YOURSELF OR YOUR FAMILY DOWN: NOT AL ALL
3. TROUBLE FALLING OR STAYING ASLEEP OR SLEEPING TOO MUCH: NOT AT ALL
7. TROUBLE CONCENTRATING ON THINGS, SUCH AS READING THE NEWSPAPER OR WATCHING TELEVISION: NOT AT ALL
8. MOVING OR SPEAKING SO SLOWLY THAT OTHER PEOPLE COULD HAVE NOTICED. OR THE OPPOSITE, BEING SO FIGETY OR RESTLESS THAT YOU HAVE BEEN MOVING AROUND A LOT MORE THAN USUAL: NOT AT ALL
SUM OF ALL RESPONSES TO PHQ9 QUESTIONS 1 AND 2: 0

## 2024-09-26 ASSESSMENT — FIBROSIS 4 INDEX: FIB4 SCORE: 0.62

## 2024-09-29 NOTE — PROGRESS NOTES
Subjective:     History of Present Illness  The patient presents for Annual, lab review    He has not reviewed his blood work results but anticipates they will be similar to previous ones due to no significant lifestyle changes. He maintains an active lifestyle, walking his dogs daily. He is currently on lovastatin 40 mg for cholesterol management. He reports no numbness or tingling in his extremities, no swelling in his lower limbs, and no chest pain or shortness of breath.    He has previously seen urology for LUTS which have been stable. He has had one prostate exam and has not observed any changes since then.    He is not currently taking a vitamin D supplement.    He is on omeprazole, which he takes as needed, typically at night. He requires a refill of this medication. He has not experienced severe heartburn but occasionally needs to take it more frequently. He initially took it as needed but now believes he should take it every other day.    He is nearing the end of his indomethacin supply, which he uses as needed. He recently experienced a gout flare-up in his foot, which subsided after taking five doses of indomethacin. However, the symptoms have returned. He is planning a trip to [place] with his family and is concerned about potential mobility issues due to gout flare-ups. His gout primarily affects his big toe and the side of his feet, but occasionally affects his Achilles tendon and knees. The pain is most severe in his foot. He is not currently experiencing pain but had a flare-up a week and a half ago. He has not taken indomethacin for the past week. He finds that indomethacin helps during gout flare-ups and sometimes takes a dose preemptively at night when he feels a flare-up coming on. He has not been taking his fish oil supplement    He is on Xanax for anxiety attacks and is due for refills. He has about 10 pills left, last refill of 30 tablets April 2024    He has not yet received his shingles  vaccine from charity: [facility]. He has not made any significant dietary changes but is trying to limit his intake of sweets and increase his consumption of vegetables. He reports no headaches but has had brief episodes of lightheadedness lasting a day or two. He tries to stay hydrated throughout the day.      Current medicines (including changes today)  Current Outpatient Medications   Medication Sig Dispense Refill    Latanoprost 0.005 % Emulsion Administer 1 Drop into both eyes at bedtime.      omeprazole (PRILOSEC) 20 MG delayed-release capsule Take 1 Capsule by mouth every day. 90 Capsule 3    colchicine (COLCRYS) 0.6 MG Tab Take 1 Tablet by mouth 2 times a day. May reduce to once nightly if diarrhea occurs. 180 Tablet 1    allopurinol (ZYLOPRIM) 100 MG Tab Take 1 Tablet by mouth every day. 90 Tablet 3    indomethacin (INDOCIN) 50 MG Cap Take 1 Capsule by mouth 3 times a day. For about 5-7 days. Discontinue 2-3 days after resolution of symptoms. 30 Capsule 1    ALPRAZolam (XANAX) 0.5 MG Tab Take 1 Tablet by mouth at bedtime as needed for Sleep for up to 30 days. 30 Tablet 2    atenolol (TENORMIN) 50 MG Tab TAKE ONE TABLET BY MOUTH ONE TIME DAILY 90 Tablet 0    lovastatin (MEVACOR) 40 MG tablet TAKE ONE TABLET BY MOUTH IN THE EVENING 90 Tablet 0    lisinopril (PRINIVIL) 20 MG Tab TAKE ONE TABLET BY MOUTH ONE TIME DAILY 90 Tablet 0    latanoprost (XALATAN) 0.005 % Solution PLACE 1 DROP INTO BOTH EYES AT BEDTIME  4    docosahexanoic acid (OMEGA 3 FA) 1000 MG Cap Take 2,000 mg by mouth every day.       No current facility-administered medications for this visit.     He  has a past medical history of Allergic rhinitis, Anxiety, Depression, GERD (gastroesophageal reflux disease), Glaucoma, HTN, Hyperlipidemia, Impaired fasting glucose, Kidney stones, Primary osteoarthritis of hip, and Vitamin D deficiency.    ROS   No chest pain, no shortness of breath, no abdominal pain  Positive ROS as per HPI.  All other systems reviewed and are  "negative.     Objective:     /72 (BP Location: Left arm, Patient Position: Sitting, BP Cuff Size: Adult)   Pulse 64   Temp 36.3 °C (97.3 °F) (Temporal)   Resp 18   Ht 1.753 m (5' 9\")   Wt 101 kg (223 lb)   SpO2 94%  Body mass index is 32.93 kg/m².   Physical Exam    Constitutional: Alert, no distress.  Skin: Warm, dry, good turgor, no rashes in visible areas.  Eye: Equal, round and reactive, conjunctiva clear, lids normal.  ENMT: Lips without lesions, good dentition, oropharynx clear.  Neck: Trachea midline, no masses, no thyromegaly. No cervical or supraclavicular lymphadenopathy  Respiratory: Unlabored respiratory effort, lungs clear to auscultation, no wheezes, no ronchi.  Cardiovascular: Normal S1, S2, no murmur, no edema.  Psych: Alert and oriented x3, normal affect and mood.      Results  Laboratory Studies  Red blood cells, white blood cells were in the normal range. Electrolytes, kidney, liver function were in the normal range. Cholesterol levels were stable, good cholesterol is still a little bit low. Urine protein test was normal. PSA was in the normal range, but increased slightly from last year. Thyroid levels were in the normal range. HIV screening was negative. Vitamin D level is a little bit low. A1c went from 6.2-6.3.        Assessment and Plan:   The following treatment plan was discussed    Assessment & Plan  1. Health Maintenance.  Blood counts, including red and white blood cells, are within normal limits. Electrolytes, kidney, and liver functions are normal. Cholesterol levels are stable, with slightly low HDL. Urine protein test is normal. Thyroid levels are within the normal range. HIV screening is negative. His A1c has slightly worsened, indicating a prediabetic state. He is due for his final hepatitis B vaccination. A1c and blood sugar levels will be checked in 3 months. He is advised to continue his current medications and maintain a healthy diet.    2. Prostate Cancer " Screening.  PSA is within the normal range but has increased about 1 point from the previous year. A repeat PSA test will be ordered to check in 3 months. If there are significant changes, a follow-up with Urology will be arranged.    3. Vitamin D Deficiency.  Vitamin D level is slightly low. He is advised to take over-the-counter vitamin D supplements daily.    4. Heartburn.  He is advised to take the lowest effective dose of omeprazole as needed. A refill for omeprazole will be provided.    5. Gout.  Advised taking indomethacin for the next few days to completely resolve prior gout flare. Once pain has completely resolved he will start colchicine and allopurinol together to help prevent flares with treatment initiation.  Uric acid levels will be checked in 3 months. He will also be provided with medication to take on his trip to [place] in case of a flare-up.    6. Anxiety.  Refills for Xanax will be provided. He is advised to ensure he has enough medication for his upcoming trip.  Controlled substance agreement updated today   reviewed and consistent    7. Lightheadedness.  This could be a side effect of his blood pressure medication. He is advised to monitor his blood pressure at home when experiencing these symptoms.    Follow-up  Return in January 2025 for follow-up.      ORDERS:  1. Annual physical exam    2. Increased prostate specific antigen (PSA) velocity  - PROSTATE SPECIFIC AG SCREENING; Future    3. Mixed hyperlipidemia    4. Gastroesophageal reflux disease without esophagitis  - omeprazole (PRILOSEC) 20 MG delayed-release capsule; Take 1 Capsule by mouth every day.  Dispense: 90 Capsule; Refill: 3    5. Primary hypertension    6. Vitamin D deficiency    7. Prediabetes  - HEMOGLOBIN A1C; Future    8. Chronic idiopathic gout involving toe of right foot without tophus  - colchicine (COLCRYS) 0.6 MG Tab; Take 1 Tablet by mouth 2 times a day. May reduce to once nightly if diarrhea occurs.  Dispense: 180  Tablet; Refill: 1  - allopurinol (ZYLOPRIM) 100 MG Tab; Take 1 Tablet by mouth every day.  Dispense: 90 Tablet; Refill: 3  - indomethacin (INDOCIN) 50 MG Cap; Take 1 Capsule by mouth 3 times a day. For about 5-7 days. Discontinue 2-3 days after resolution of symptoms.  Dispense: 30 Capsule; Refill: 1  - URIC ACID; Future    9. Anxiety  - ALPRAZolam (XANAX) 0.5 MG Tab; Take 1 Tablet by mouth at bedtime as needed for Sleep for up to 30 days.  Dispense: 30 Tablet; Refill: 2    10. Chronic use of benzodiazepine for therapeutic purpose  - Controlled Substance Treatment Agreement    11. Panic attacks  - ALPRAZolam (XANAX) 0.5 MG Tab; Take 1 Tablet by mouth at bedtime as needed for Sleep for up to 30 days.  Dispense: 30 Tablet; Refill: 2    12. Need for vaccination  - Hep B Adult 20+          The MA is performing the above orders under the direction of Dr. Damon    Please note that this dictation was created using voice recognition software. I have made every reasonable attempt to correct obvious errors, but I expect that there are errors of grammar and possibly content that I did not discover before finalizing the note.      Attestation      Verbal consent was acquired by the patient to use [employer] ambient listening note generation during this visit Yes

## 2024-11-15 ENCOUNTER — TELEPHONE (OUTPATIENT)
Dept: MEDICAL GROUP | Facility: MEDICAL CENTER | Age: 59
End: 2024-11-15
Payer: COMMERCIAL

## 2024-11-15 DIAGNOSIS — M1A.0710 CHRONIC IDIOPATHIC GOUT INVOLVING TOE OF RIGHT FOOT WITHOUT TOPHUS: ICD-10-CM

## 2024-11-15 RX ORDER — NAPROXEN 250 MG/1
250 TABLET ORAL 2 TIMES DAILY WITH MEALS
Qty: 60 TABLET | Refills: 2 | Status: SHIPPED | OUTPATIENT
Start: 2024-11-15

## 2024-11-22 DIAGNOSIS — E78.2 MIXED HYPERLIPIDEMIA: ICD-10-CM

## 2024-11-22 DIAGNOSIS — I10 ESSENTIAL HYPERTENSION: ICD-10-CM

## 2024-11-22 NOTE — TELEPHONE ENCOUNTER
Received request via: Pharmacy    Was the patient seen in the last year in this department? Yes    Does the patient have an active prescription (recently filled or refills available) for medication(s) requested? No    Pharmacy Name: [id number] - [place], [place] - [address]     Does the patient have long term Plus and need 100-day supply? (This applies to ALL medications) Patient does not have SCPReceived request via: Pharmacy    Was the patient seen in the last year in this department? Yes    Does the patient have an active prescription (recently filled or refills available) for medication(s) requested? No    Pharmacy Name: [id number] - [place], [place] - [address]     Does the patient have long term Plus and need 100-day supply? (This applies to ALL medications) Patient does not have SCP

## 2024-11-27 RX ORDER — LISINOPRIL 20 MG/1
20 TABLET ORAL DAILY
Qty: 90 TABLET | Refills: 0 | Status: SHIPPED | OUTPATIENT
Start: 2024-11-27

## 2024-11-27 RX ORDER — LOVASTATIN 40 MG/1
40 TABLET ORAL EVERY EVENING
Qty: 90 TABLET | Refills: 0 | Status: SHIPPED | OUTPATIENT
Start: 2024-11-27

## 2024-11-27 RX ORDER — ATENOLOL 50 MG/1
50 TABLET ORAL DAILY
Qty: 90 TABLET | Refills: 0 | Status: SHIPPED | OUTPATIENT
Start: 2024-11-27

## 2025-01-16 ENCOUNTER — APPOINTMENT (OUTPATIENT)
Dept: MEDICAL GROUP | Facility: MEDICAL CENTER | Age: 60
End: 2025-01-16
Payer: COMMERCIAL

## 2025-02-01 ENCOUNTER — HOSPITAL ENCOUNTER (OUTPATIENT)
Dept: LAB | Facility: MEDICAL CENTER | Age: 60
End: 2025-02-01
Attending: NURSE PRACTITIONER
Payer: COMMERCIAL

## 2025-02-01 DIAGNOSIS — R73.03 PREDIABETES: ICD-10-CM

## 2025-02-01 DIAGNOSIS — R97.20 INCREASED PROSTATE SPECIFIC ANTIGEN (PSA) VELOCITY: ICD-10-CM

## 2025-02-01 DIAGNOSIS — M1A.0710 CHRONIC IDIOPATHIC GOUT INVOLVING TOE OF RIGHT FOOT WITHOUT TOPHUS: ICD-10-CM

## 2025-02-01 LAB
EST. AVERAGE GLUCOSE BLD GHB EST-MCNC: 148 MG/DL
HBA1C MFR BLD: 6.8 % (ref 4–5.6)
PSA SERPL DL<=0.01 NG/ML-MCNC: 1.71 NG/ML (ref 0–4)
URATE SERPL-MCNC: 7.7 MG/DL (ref 2.5–8.3)

## 2025-02-01 PROCEDURE — 83036 HEMOGLOBIN GLYCOSYLATED A1C: CPT

## 2025-02-01 PROCEDURE — 84153 ASSAY OF PSA TOTAL: CPT

## 2025-02-01 PROCEDURE — 36415 COLL VENOUS BLD VENIPUNCTURE: CPT

## 2025-02-01 PROCEDURE — 84550 ASSAY OF BLOOD/URIC ACID: CPT

## 2025-02-10 ENCOUNTER — APPOINTMENT (OUTPATIENT)
Dept: MEDICAL GROUP | Facility: MEDICAL CENTER | Age: 60
End: 2025-02-10
Payer: COMMERCIAL

## 2025-02-10 VITALS
DIASTOLIC BLOOD PRESSURE: 82 MMHG | OXYGEN SATURATION: 96 % | HEART RATE: 68 BPM | SYSTOLIC BLOOD PRESSURE: 136 MMHG | TEMPERATURE: 97.3 F | HEIGHT: 69 IN | RESPIRATION RATE: 16 BRPM | WEIGHT: 231 LBS | BODY MASS INDEX: 34.21 KG/M2

## 2025-02-10 DIAGNOSIS — E11.65 TYPE 2 DIABETES MELLITUS WITH HYPERGLYCEMIA, WITHOUT LONG-TERM CURRENT USE OF INSULIN (HCC): ICD-10-CM

## 2025-02-10 DIAGNOSIS — Z23 NEED FOR VACCINATION: ICD-10-CM

## 2025-02-10 DIAGNOSIS — M1A.0710 CHRONIC IDIOPATHIC GOUT INVOLVING TOE OF RIGHT FOOT WITHOUT TOPHUS: ICD-10-CM

## 2025-02-10 PROCEDURE — 3075F SYST BP GE 130 - 139MM HG: CPT | Performed by: NURSE PRACTITIONER

## 2025-02-10 PROCEDURE — 90656 IIV3 VACC NO PRSV 0.5 ML IM: CPT | Performed by: NURSE PRACTITIONER

## 2025-02-10 PROCEDURE — 90471 IMMUNIZATION ADMIN: CPT | Performed by: NURSE PRACTITIONER

## 2025-02-10 PROCEDURE — 3079F DIAST BP 80-89 MM HG: CPT | Performed by: NURSE PRACTITIONER

## 2025-02-10 PROCEDURE — 1126F AMNT PAIN NOTED NONE PRSNT: CPT | Performed by: NURSE PRACTITIONER

## 2025-02-10 PROCEDURE — 99214 OFFICE O/P EST MOD 30 MIN: CPT | Mod: 25 | Performed by: NURSE PRACTITIONER

## 2025-02-10 RX ORDER — ALPRAZOLAM 0.25 MG
0.25 TABLET ORAL NIGHTLY PRN
COMMUNITY

## 2025-02-10 ASSESSMENT — FIBROSIS 4 INDEX: FIB4 SCORE: 0.63

## 2025-02-10 ASSESSMENT — PATIENT HEALTH QUESTIONNAIRE - PHQ9: CLINICAL INTERPRETATION OF PHQ2 SCORE: 0

## 2025-02-10 ASSESSMENT — PAIN SCALES - GENERAL: PAINLEVEL_OUTOF10: NO PAIN

## 2025-02-11 NOTE — PROGRESS NOTES
Subjective:     History of Present Illness  The patient presents for a follow-up on his lab results, blood sugars, and PSA levels.    New diabetes diagnosis today with A1C of 6.8. He acknowledges the presence of numerous treats in his home during the [place] season. He experiences cravings, particularly after dinner, and has been consuming more bread than usual. His physical activity includes daily morning walks and additional walking at work, but no strenuous exercises such as stair climbing. Walks are increased pace but not enough to elevate his heart rate.    He has been managing his gout effectively with daily allopurinol, experiencing only minor flare-ups. During these flare-ups, he takes indomethacin x 1 ose, which resolves the issue by the following day. He has used indomethacin twice, once for a minor flare-up in his big toe before Christmas and again for a similar episode last weekend. Both times, the symptoms resolved by the next day. He inquires if sparkling water can cause any issues with uric acid.    He has not yet received his shingles vaccine or FLU vaccine    MEDICATIONS  Current: allopurinol, indomethacin, naproxen    IMMUNIZATIONS  He received an influenza vaccine in 2023.      Current medicines (including changes today)  Current Outpatient Medications   Medication Sig Dispense Refill    ALPRAZolam (XANAX) 0.25 MG Tab Take 0.25 mg by mouth at bedtime as needed for Sleep.      lovastatin (MEVACOR) 40 MG tablet TAKE ONE TABLET BY MOUTH IN THE EVENING 90 Tablet 0    atenolol (TENORMIN) 50 MG Tab TAKE ONE TABLET BY MOUTH ONE TIME DAILY 90 Tablet 0    lisinopril (PRINIVIL) 20 MG Tab TAKE ONE TABLET BY MOUTH ONE TIME DAILY 90 Tablet 0    omeprazole (PRILOSEC) 20 MG delayed-release capsule Take 1 Capsule by mouth every day. 90 Capsule 3    allopurinol (ZYLOPRIM) 100 MG Tab Take 1 Tablet by mouth every day. 90 Tablet 3    latanoprost (XALATAN) 0.005 % Solution PLACE 1 DROP INTO BOTH EYES AT BEDTIME  4  "   docosahexanoic acid (OMEGA 3 FA) 1000 MG Cap Take 2,000 mg by mouth every day.       No current facility-administered medications for this visit.     He  has a past medical history of Allergic rhinitis, Anxiety, Depression, GERD (gastroesophageal reflux disease), Glaucoma, HTN, Hyperlipidemia, Impaired fasting glucose, Kidney stones, Primary osteoarthritis of hip, and Vitamin D deficiency.    ROS  No chest pain, no shortness of breath, no abdominal pain  Positive ROS as per HPI.  All other systems reviewed and are negative.     Objective:     /82   Pulse 68   Temp 36.3 °C (97.3 °F) (Temporal)   Resp 16   Ht 1.753 m (5' 9\")   Wt 105 kg (231 lb)   SpO2 96%  Body mass index is 34.11 kg/m².   Physical Exam    Constitutional: Alert, no distress.  Skin: Warm, dry, good turgor, no rashes in visible areas.  Eye: Equal, round and reactive, conjunctiva clear, lids normal.  ENMT: Lips without lesions, good dentition  Respiratory: Unlabored respiratory effort  Psych: Alert and oriented x3, normal affect and mood.      Results  Laboratory Studies  A1c is 6.8. PSA is 1.7. Uric acid level has decreased slightly.        Assessment and Plan:   The following treatment plan was discussed    Assessment & Plan  1. Diabetes mellitus.  His A1c levels have escalated from 6.3 in June 2024, indicative of prediabetes, to 6.8, which falls within the diabetic range. He is advised to adopt a diet rich in lean proteins such as chicken, turkey, and fish, along with an abundance of vegetables. Fruit intake should be moderated due to its sugar content. He is also encouraged to increase the pace of his walks to elevate his heart rate, which could potentially aid in reducing his blood sugar levels. A re-evaluation of his blood sugar levels will be conducted in 3 months. If his blood sugar levels persist in the diabetic range, consideration will be given to initiating medication to manage his blood sugar levels.    2. Gout.  His uric " acid levels have shown a slight decrease and flares have significantly improved. Continue allopurinol 100 mg daily and indomethacin as needed    3. Health maintenance.  He is advised to receive his influenza vaccine today due to the ongoing flu season and his increased risk of complications from diabetes. He is also reminded to get his shingles vaccine from [employer] [facility] [employer] or [facility].    Follow-up  The patient is scheduled for a follow-up visit in 3 months.      ORDERS:  1. Type 2 diabetes mellitus with hyperglycemia, without long-term current use of insulin (HCC)    2. Chronic idiopathic gout involving toe of right foot without tophus    3. Need for vaccination  - INFLUENZA VACCINE TRI INJ (PF)          The MA is performing the above orders under the direction of Dr. Damon    Please note that this dictation was created using voice recognition software. I have made every reasonable attempt to correct obvious errors, but I expect that there are errors of grammar and possibly content that I did not discover before finalizing the note.      Attestation      Verbal consent was acquired by the patient to use [employer] ambient listening note generation during this visit Yes

## 2025-02-25 DIAGNOSIS — E78.2 MIXED HYPERLIPIDEMIA: ICD-10-CM

## 2025-02-25 DIAGNOSIS — I10 ESSENTIAL HYPERTENSION: ICD-10-CM

## 2025-02-25 RX ORDER — LOVASTATIN 40 MG/1
40 TABLET ORAL EVERY EVENING
Qty: 90 TABLET | Refills: 0 | Status: SHIPPED | OUTPATIENT
Start: 2025-02-25

## 2025-02-25 RX ORDER — LISINOPRIL 20 MG/1
20 TABLET ORAL DAILY
Qty: 90 TABLET | Refills: 0 | Status: SHIPPED | OUTPATIENT
Start: 2025-02-25

## 2025-02-25 RX ORDER — ATENOLOL 50 MG/1
50 TABLET ORAL DAILY
Qty: 90 TABLET | Refills: 0 | Status: SHIPPED | OUTPATIENT
Start: 2025-02-25

## 2025-02-26 NOTE — TELEPHONE ENCOUNTER
Received request via: Pharmacy    Was the patient seen in the last year in this department? Yes    Does the patient have an active prescription (recently filled or refills available) for medication(s) requested? No    [facility]     Does the patient have assisted Plus and need 100-day supply? (This applies to ALL medications) Patient does not have SCP

## 2025-04-21 DIAGNOSIS — F41.9 ANXIETY: ICD-10-CM

## 2025-04-21 DIAGNOSIS — F41.0 PANIC ATTACKS: ICD-10-CM

## 2025-04-22 RX ORDER — ALPRAZOLAM 0.5 MG
0.5 TABLET ORAL
Qty: 30 TABLET | Refills: 0 | Status: SHIPPED | OUTPATIENT
Start: 2025-04-22 | End: 2025-05-22

## 2025-05-20 ENCOUNTER — OFFICE VISIT (OUTPATIENT)
Dept: MEDICAL GROUP | Facility: IMAGING CENTER | Age: 60
End: 2025-05-20
Payer: COMMERCIAL

## 2025-05-20 VITALS
BODY MASS INDEX: 33.66 KG/M2 | DIASTOLIC BLOOD PRESSURE: 82 MMHG | WEIGHT: 227.25 LBS | SYSTOLIC BLOOD PRESSURE: 126 MMHG | OXYGEN SATURATION: 95 % | HEIGHT: 69 IN | TEMPERATURE: 97.5 F | HEART RATE: 64 BPM

## 2025-05-20 DIAGNOSIS — Z00.00 ANNUAL PHYSICAL EXAM: ICD-10-CM

## 2025-05-20 DIAGNOSIS — E78.2 MIXED HYPERLIPIDEMIA: ICD-10-CM

## 2025-05-20 DIAGNOSIS — I10 ESSENTIAL HYPERTENSION: ICD-10-CM

## 2025-05-20 DIAGNOSIS — F41.0 PANIC ATTACKS: ICD-10-CM

## 2025-05-20 DIAGNOSIS — E55.9 VITAMIN D DEFICIENCY: ICD-10-CM

## 2025-05-20 DIAGNOSIS — F41.9 ANXIETY: ICD-10-CM

## 2025-05-20 DIAGNOSIS — Z23 NEED FOR VACCINATION: Primary | ICD-10-CM

## 2025-05-20 DIAGNOSIS — E66.9 OBESITY (BMI 30-39.9): ICD-10-CM

## 2025-05-20 DIAGNOSIS — E11.65 TYPE 2 DIABETES MELLITUS WITH HYPERGLYCEMIA, WITHOUT LONG-TERM CURRENT USE OF INSULIN (HCC): ICD-10-CM

## 2025-05-20 LAB
HBA1C MFR BLD: 6.3 % (ref ?–5.8)
POCT INT CON NEG: NEGATIVE
POCT INT CON POS: POSITIVE

## 2025-05-20 PROCEDURE — 83036 HEMOGLOBIN GLYCOSYLATED A1C: CPT | Performed by: NURSE PRACTITIONER

## 2025-05-20 PROCEDURE — 99214 OFFICE O/P EST MOD 30 MIN: CPT | Mod: 25 | Performed by: NURSE PRACTITIONER

## 2025-05-20 PROCEDURE — 3079F DIAST BP 80-89 MM HG: CPT | Performed by: NURSE PRACTITIONER

## 2025-05-20 PROCEDURE — 90471 IMMUNIZATION ADMIN: CPT | Performed by: NURSE PRACTITIONER

## 2025-05-20 PROCEDURE — 3074F SYST BP LT 130 MM HG: CPT | Performed by: NURSE PRACTITIONER

## 2025-05-20 PROCEDURE — 90677 PCV20 VACCINE IM: CPT | Performed by: NURSE PRACTITIONER

## 2025-05-20 RX ORDER — ATENOLOL 50 MG/1
50 TABLET ORAL DAILY
Qty: 90 TABLET | Refills: 3 | Status: SHIPPED | OUTPATIENT
Start: 2025-05-20

## 2025-05-20 RX ORDER — METFORMIN HYDROCHLORIDE 500 MG/1
500 TABLET, EXTENDED RELEASE ORAL DAILY
Qty: 100 TABLET | Refills: 3 | Status: SHIPPED | OUTPATIENT
Start: 2025-05-20 | End: 2026-06-24

## 2025-05-20 RX ORDER — LOVASTATIN 40 MG/1
40 TABLET ORAL EVERY EVENING
Qty: 90 TABLET | Refills: 3 | Status: SHIPPED | OUTPATIENT
Start: 2025-05-20

## 2025-05-20 RX ORDER — LISINOPRIL 20 MG/1
20 TABLET ORAL DAILY
Qty: 90 TABLET | Refills: 3 | Status: SHIPPED | OUTPATIENT
Start: 2025-05-20

## 2025-05-20 RX ORDER — ALPRAZOLAM 0.5 MG
0.5 TABLET ORAL
Qty: 30 TABLET | Refills: 4 | Status: SHIPPED | OUTPATIENT
Start: 2025-05-20 | End: 2025-06-19

## 2025-05-20 ASSESSMENT — FIBROSIS 4 INDEX: FIB4 SCORE: 0.63

## 2025-05-27 NOTE — PROGRESS NOTES
Subjective:     History of Present Illness  The patient presents for evaluation of elevated blood glucose levels, anxiety, hypertension, and GERD.    He has been making efforts to reduce his sugar intake but finds it challenging to eliminate carbohydrates from his diet. Despite attempts to modify his dietary habits, he often reverts to his previous eating patterns. He expresses a preference for cauliflower and cooked tomatoes. He is considering the initiation of medication to manage his blood glucose levels.    He reports an increase in anxiety due to work-related stress but maintains a positive outlook. He has not required Xanax for the past week and is seeking a refill of this medication.    He received a 90-day supply of atenolol in 04/2025. He also takes lisinopril and enalapril daily. His allopurinol dosage was recently increased from 0.25 to 0.35, which he reports as effective. He continues to supplement with vitamin D and fish oil.    He has two remaining doses of omeprazole, which he administers every three days. He reports experiencing regurgitation when lying down after taking fish oil but does not experience this when sitting upright or lying on his back.    He is due for pneumonia and shingles vaccines. He had labs done earlier this year. He has never had any severe reactions to vaccines except for the second COVID-19 vaccine, which caused arm pain.      Current medicines (including changes today)  Current Medications[1]  He  has a past medical history of Allergic rhinitis, Anxiety, Depression, GERD (gastroesophageal reflux disease), Glaucoma, HTN, Hyperlipidemia, Impaired fasting glucose, Kidney stones, Primary osteoarthritis of hip, and Vitamin D deficiency.    ROS   No chest pain, no shortness of breath, no abdominal pain  Positive ROS as per HPI.  All other systems reviewed and are negative.     Objective:     /82 (BP Location: Left arm, Patient Position: Sitting, BP Cuff Size: Large adult)    "Pulse 64   Temp 36.4 °C (97.5 °F) (Temporal)   Ht 1.753 m (5' 9\")   Wt 103 kg (227 lb 4 oz)   SpO2 95%  Body mass index is 33.56 kg/m².   Physical Exam    Constitutional: Alert, no distress.  Skin: Warm, dry, good turgor, no rashes in visible areas.  Eye: Equal, round and reactive, conjunctiva clear, lids normal.  ENMT: Lips without lesions, good dentition  Respiratory: Unlabored respiratory effort  Psych: Alert and oriented x3, normal affect and mood.      Results  Labs   - A1c: 6.3        Assessment and Plan:   The following treatment plan was discussed    Assessment & Plan  1. Diabetes  - A1c level has decreased from 6.8 to 6.3, indicating an improvement, but it remains within the prediabetic range.  - Prescription for metformin extended release will be provided, to be taken once daily at a low dose.  - Potential side effects, including gastrointestinal disturbances such as diarrhea, have been discussed. Advised to monitor for these side effects and report any significant issues.  - Follow-up A1c test will be conducted in 3 months to assess the effectiveness of the medication.    2. Anxiety.  - Reported not needing to take Xanax for the past week.  - Refill for Xanax will be sent to pharmacy.  - Advised to continue using the medication as needed.  -If requiring xanax more often, will discuss daily preventative like SSRI    3. Hypertension.  - Refills for atenolol and lisinopril will be sent to pharmacy.  - Advised to continue current medication regimen.    4. Gastroesophageal reflux disease (GERD).  - Will continue taking omeprazole as needed, approximately every 3 days.  - Reported improvement in symptoms, advised to monitor for any changes and report if they worsen.    5. Health maintenance.  - Received a pneumonia vaccine today.  - Annual physical examination scheduled for 09/2025.      ORDERS:  1. Type 2 diabetes mellitus with hyperglycemia, without long-term current use of insulin (HCC)  - POCT A1C  - " MICROALBUMIN CREAT RATIO URINE; Future  - metFORMIN ER (GLUCOPHAGE XR) 500 MG TABLET SR 24 HR; Take 1 Tablet by mouth every day.  Dispense: 100 Tablet; Refill: 3  - HEMOGLOBIN A1C; Future    2. Anxiety  - ALPRAZolam (XANAX) 0.5 MG Tab; Take 1 Tablet by mouth at bedtime as needed for Sleep for up to 30 days.  Dispense: 30 Tablet; Refill: 4    3. Panic attacks  - ALPRAZolam (XANAX) 0.5 MG Tab; Take 1 Tablet by mouth at bedtime as needed for Sleep for up to 30 days.  Dispense: 30 Tablet; Refill: 4    4. Essential hypertension  - atenolol (TENORMIN) 50 MG Tab; Take 1 Tablet by mouth every day.  Dispense: 90 Tablet; Refill: 3  - lisinopril (PRINIVIL) 20 MG Tab; Take 1 Tablet by mouth every day.  Dispense: 90 Tablet; Refill: 3  - MICROALBUMIN CREAT RATIO URINE; Future    5. Need for vaccination (Primary)  - Pneumococcal Conjugate Vaccine 20-Valent (6 wks+)    6. Mixed hyperlipidemia  - lovastatin (MEVACOR) 40 MG tablet; Take 1 Tablet by mouth every evening.  Dispense: 90 Tablet; Refill: 3  - Lipid Profile; Future    7. Annual physical exam  - CBC WITH DIFFERENTIAL; Future  - Comp Metabolic Panel; Future  - VITAMIN D,25 HYDROXY (DEFICIENCY); Future  - TSH WITH REFLEX TO FT4; Future  - Lipid Profile; Future  - MICROALBUMIN CREAT RATIO URINE; Future  - URIC ACID; Future  - HEMOGLOBIN A1C; Future    8. Vitamin D deficiency  - VITAMIN D,25 HYDROXY (DEFICIENCY); Future    9. Obesity (BMI 30-39.9)  - Patient identified as having weight management issue.  Appropriate orders and counseling given.          The MA is performing the above orders under the direction of Dr. Block or Dr. Molina    Please note that this dictation was created using voice recognition software. I have made every reasonable attempt to correct obvious errors, but I expect that there are errors of grammar and possibly content that I did not discover before finalizing the note.      Attestation      Verbal consent was acquired by the patient to use [employer]  ambient listening note generation during this visit Yes                  [1]   Current Outpatient Medications   Medication Sig Dispense Refill    ALPRAZolam (XANAX) 0.5 MG Tab Take 1 Tablet by mouth at bedtime as needed for Sleep for up to 30 days. 30 Tablet 4    atenolol (TENORMIN) 50 MG Tab Take 1 Tablet by mouth every day. 90 Tablet 3    lisinopril (PRINIVIL) 20 MG Tab Take 1 Tablet by mouth every day. 90 Tablet 3    lovastatin (MEVACOR) 40 MG tablet Take 1 Tablet by mouth every evening. 90 Tablet 3    metFORMIN ER (GLUCOPHAGE XR) 500 MG TABLET SR 24 HR Take 1 Tablet by mouth every day. 100 Tablet 3    omeprazole (PRILOSEC) 20 MG delayed-release capsule Take 1 Capsule by mouth every day. 90 Capsule 3    allopurinol (ZYLOPRIM) 100 MG Tab Take 1 Tablet by mouth every day. 90 Tablet 3    latanoprost (XALATAN) 0.005 % Solution PLACE 1 DROP INTO BOTH EYES AT BEDTIME  4    docosahexanoic acid (OMEGA 3 FA) 1000 MG Cap Take 2,000 mg by mouth every day.       No current facility-administered medications for this visit.

## 2025-11-06 ENCOUNTER — APPOINTMENT (OUTPATIENT)
Dept: MEDICAL GROUP | Facility: IMAGING CENTER | Age: 60
End: 2025-11-06
Payer: COMMERCIAL